# Patient Record
Sex: FEMALE | Race: WHITE | NOT HISPANIC OR LATINO | URBAN - METROPOLITAN AREA
[De-identification: names, ages, dates, MRNs, and addresses within clinical notes are randomized per-mention and may not be internally consistent; named-entity substitution may affect disease eponyms.]

---

## 2017-04-27 ENCOUNTER — TELEPHONE (OUTPATIENT)
Dept: MEDICAL GROUP | Age: 57
End: 2017-04-27

## 2017-04-27 DIAGNOSIS — Z12.31 ENCOUNTER FOR SCREENING MAMMOGRAM FOR BREAST CANCER: ICD-10-CM

## 2017-04-27 DIAGNOSIS — Z11.59 NEED FOR HEPATITIS C SCREENING TEST: ICD-10-CM

## 2017-04-27 DIAGNOSIS — E78.2 MIXED HYPERLIPIDEMIA: ICD-10-CM

## 2017-04-27 NOTE — TELEPHONE ENCOUNTER
Phone Number Called: [phone]    Message: patient has upcoming appt with Dr. Olguin and requesting routine labs with a hep c order.  Please advise.    Future Appointments       Provider Department Center    5/18/2017 10:40 AM Abran Olguin M.D. [facility] [address]            Left Message for patient to call back: no

## 2017-04-27 NOTE — TELEPHONE ENCOUNTER
Phone Number Called: [phone] (home)     Message: Left VM for patient to return call    Left Message for patient to call back: yes

## 2017-04-28 NOTE — TELEPHONE ENCOUNTER
Phone Number Called: [phone] (home)       Message: Left VM for patient to return call    Left Message for patient to call back: yes

## 2017-05-01 NOTE — TELEPHONE ENCOUNTER
Phone Number Called: [phone] (home)     Message: Left 3rd VM for patient to return our call. Letter also mailed to home address with order information below.    Left Message for patient to call back: yes

## 2017-05-17 ENCOUNTER — TELEPHONE (OUTPATIENT)
Dept: MEDICAL GROUP | Age: 57
End: 2017-05-17

## 2017-05-17 NOTE — TELEPHONE ENCOUNTER
ESTABLISHED PATIENT PRE-VISIT PLANNING     Note: Patient will not be contacted if there is no indication to call.     1.  Reviewed note from last office visit with PCP and/or other med group provider: Yes    2.  If any orders were placed at last visit, do we have Results/Consult Notes?        •  Labs - Labs ordered, completed and results are in chart.       •  Imaging - Imaging was not ordered at last office visit.       •  Referrals - No referrals were ordered at last office visit.    3.  Immunizations were updated in [facility] using WebIZ?: Yes       •  Web Iz Recommendations: FLU HEPATITIS A  HEPATITIS B MMR  TDAP VARICELLA (Chicken Pox)     4.  Patient is due for the following Health Maintenance Topics:   Health Maintenance Due   Topic Date Due   • IMM DTaP/Tdap/Td Vaccine (1 - Tdap) 06/29/1981         5.  Patient was not informed to arrive 15 min prior to their scheduled appointment and bring in their medication bottles.

## 2017-05-18 ENCOUNTER — OFFICE VISIT (OUTPATIENT)
Dept: MEDICAL GROUP | Age: 57
End: 2017-05-18
Payer: COMMERCIAL

## 2017-05-18 VITALS
HEIGHT: 67 IN | TEMPERATURE: 97.9 F | DIASTOLIC BLOOD PRESSURE: 84 MMHG | HEART RATE: 46 BPM | BODY MASS INDEX: 33.59 KG/M2 | OXYGEN SATURATION: 97 % | WEIGHT: 214 LBS | SYSTOLIC BLOOD PRESSURE: 132 MMHG

## 2017-05-18 DIAGNOSIS — E78.2 MIXED HYPERLIPIDEMIA: ICD-10-CM

## 2017-05-18 DIAGNOSIS — I10 ESSENTIAL HYPERTENSION: ICD-10-CM

## 2017-05-18 DIAGNOSIS — Z12.11 SCREEN FOR COLON CANCER: ICD-10-CM

## 2017-05-18 DIAGNOSIS — M17.0 PRIMARY OSTEOARTHRITIS OF BOTH KNEES: ICD-10-CM

## 2017-05-18 DIAGNOSIS — E66.9 OBESITY (BMI 30.0-34.9): ICD-10-CM

## 2017-05-18 DIAGNOSIS — E87.6 POTASSIUM (K) DEFICIENCY: ICD-10-CM

## 2017-05-18 PROCEDURE — 99214 OFFICE O/P EST MOD 30 MIN: CPT | Performed by: INTERNAL MEDICINE

## 2017-05-18 ASSESSMENT — ENCOUNTER SYMPTOMS
MUSCULOSKELETAL NEGATIVE: 1
RESPIRATORY NEGATIVE: 1
CONSTITUTIONAL NEGATIVE: 1
PSYCHIATRIC NEGATIVE: 1
CARDIOVASCULAR NEGATIVE: 1
NEUROLOGICAL NEGATIVE: 1
EYES NEGATIVE: 1
GASTROINTESTINAL NEGATIVE: 1

## 2017-05-18 ASSESSMENT — PATIENT HEALTH QUESTIONNAIRE - PHQ9: CLINICAL INTERPRETATION OF PHQ2 SCORE: 0

## 2017-05-18 NOTE — MR AVS SNAPSHOT
"        Desirae Paris   2017 10:40 AM   Office Visit   MRN: [id number]    Department:  [address] [place] [address]   Dept Phone:  [phone]    Description:  Female : 1960   Provider:  Abran Olguin M.D.           Reason for Visit     Hyperlipidemia lab review and 6 mo follow up      Allergies as of 2017     No Known Allergies      You were diagnosed with     Obesity (BMI 30.0-34.9)   [067262]       Essential hypertension   [7435749]       Mixed hyperlipidemia   [272.2.ICD-9-CM]       Primary osteoarthritis of both knees   [843742]       Screen for colon cancer   [279232]         Vital Signs     Blood Pressure Pulse Temperature Height Weight Body Mass Index    132/84 mmHg 46 36.6 °C (97.9 °F) 1.708 m (5' 7.24\") 97.07 kg (214 lb) 33.27 kg/m2    Oxygen Saturation Smoking Status                97% Never Smoker           Basic Information     Date Of Birth Sex Race Ethnicity Preferred Language    1960 Female White Non- English      Your appointments     2017  4:00 PM   MA [id number] with S ODALIS MG 1   [employer] [facility] MAMMOGRAP[facility] ([place])    [address]  [place] [address] [phone]           No deodorant, powder, perfume or lotion under the arm or breast area.            May 18, 2018 10:00 AM   ANNUAL EXAM PREVENTATIVE with Abran Olguin M.D.   [address]  [place] [id number] [phone]              Problem List              ICD-10-CM Priority Class Noted - Resolved    Essential hypertension I10   2012 - Present    Mixed hyperlipidemia E78.2   2012 - Present    DJD (degenerative joint disease) of knee M17.10   2012 - Present    Colonic polyp-  colonoscopy- stevie 2017 K63.5   2015 - Present    Potassium (K) deficiency E87.6   2016 - Present    Obesity (BMI 30.0-34.9) E66.9   2017 - Present      Health Maintenance        Date Due Completion Dates   " IMM DTaP/Tdap/Td Vaccine (1 - Tdap) 1/8/2004 1/7/2004, 6/5/1967, 9/27/1963, 1960, 1960, 1960    MAMMOGRAM 8/14/2017 8/14/2015, 5/31/2013, 9/30/2009, 9/30/2009, 5/22/2008, 5/22/2008, 2/27/2007, 2/27/2007    COLONOSCOPY 7/3/2022 7/3/2012            Current Immunizations     DTP 6/5/1967, 9/27/1963, 1960, 1960, 1960    Influenza TIV (IM) 9/6/2012, 9/30/2011, 10/6/2010    Influenza Vac Subunit Quad Inj (Pf) 10/24/2015    Influenza Vaccine Quad Inj (Pf) 10/24/2015    Influenza Vaccine Quad Inj (Preserved) 10/28/2013    MMR Vaccine 1/7/2004    Measles Vaccine-Historical Data 5/3/1963    OPV - Historical Data 6/26/1963, 3/10/1963, 1962, 1962, 1962, 1962    TD Vaccine 1/7/2004      Below and/or attached are the medications your provider expects you to take. Review all of your home medications and newly ordered medications with your provider and/or pharmacist. Follow medication instructions as directed by your provider and/or pharmacist. Please keep your medication list with you and share with your provider. Update the information when medications are discontinued, doses are changed, or new medications (including over-the-counter products) are added; and carry medication information at all times in the event of emergency situations     Allergies:  No Known Allergies          Medications  Valid as of: May 18, 2017 - 11:21 AM    Generic Name Brand Name Tablet Size Instructions for use    Cholecalciferol (Tab) cholecalciferol 1000 UNIT Take 2 Tabs by mouth every day.        Fenofibrate (Tab) TRIGLIDE 160 MG Take 1 Tab by mouth every day. TAKE 1 TAB BY MOUTH EVERY DAY.        Glucosamine Sulfate (Cap) glucosamine Sulfate 500 MG Take 500 mg by mouth 3 times a day, with meals.        HydroCHLOROthiazide (Tab) HYDRODIURIL 25 MG Take 1 Tab by mouth every day.        Lisinopril (Tab) PRINIVIL 10 MG Take 1 Tab by mouth every day. TAKE 1 TAB BY MOUTH EVERY DAY.        Metoprolol  Tartrate (Tab) LOPRESSOR 50 MG Take 1 Tab by mouth 2 times a day.        Omega-3 Fatty Acids (Cap) fish oil 1000 MG Take 1,000 mg by mouth 3 times a day, with meals.        Potassium Chloride (Cap CR) MICRO-K 10 MEQ Take 1 Cap by mouth 2 times a day.        .                 Medicines prescribed today were sent to:     [id number] - [place] - [address] [place] [address]    Phone: [phone] Fax: [phone]    Open 24 Hours?: No      Medication refill instructions:       If your prescription bottle indicates you have medication refills left, it is not necessary to call your provider’s office. Please contact your pharmacy and they will refill your medication.    If your prescription bottle indicates you do not have any refills left, you may request refills at any time through one of the following ways: The online [employer] system (except Urgent Care), by calling your provider’s office, or by asking your pharmacy to contact your provider’s office with a refill request. Medication refills are processed only during regular business hours and may not be available until the next business day. Your provider may request additional information or to have a follow-up visit with you prior to refilling your medication.   *Please Note: Medication refills are assigned a new Rx number when refilled electronically. Your pharmacy may indicate that no refills were authorized even though a new prescription for the same medication is available at the pharmacy. Please request the medicine by name with the pharmacy before contacting your provider for a refill.        Your To Do List     Future Labs/Procedures Complete By Expires    CBC WITH DIFFERENTIAL  As directed 5/18/2018    COMP METABOLIC PANEL  As directed 5/18/2018    LIPID PROFILE  As directed 5/18/2018      Referral     A referral request has been sent to our patient care coordination department. Please allow 3-5 business days for us to process this  request and contact you either by phone or mail. If you do not hear from us by the 5th business day, please call us at [phone].           [employer] Access Code: [email or web address]  Expires: 6/17/2017 10:23 AM    [employer]  A secure, online tool to manage your health information     [employer]’s [employer]® is a secure, online tool that connects you to your personalized health information from the privacy of your home -- day or night - making it very easy for you to manage your healthcare. Once the activation process is completed, you can even access your medical information using the [employer] hany, which is available for free in the Apple Hany store or Google Play store.     [employer] provides the following levels of access (as shown below):   My Chart Features   [facility] Primary [facility] Doctor [facility]  Specialists [facility]  Urgent  Care Non-[facility]  Primary Care  Doctor   Email your healthcare team securely and privately 24/7 X X X    Manage appointments: schedule your next appointment; view details of past/upcoming appointments X      Request prescription refills. X      View recent personal medical records, including lab and immunizations X X X X   View health record, including health history, allergies, medications X X X X   Read reports about your outpatient visits, procedures, consult and ER notes X X X X   See your discharge summary, which is a recap of your hospital and/or ER visit that includes your diagnosis, lab results, and care plan. X X       How to register for [employer]:  1. Go to  [email or web address].  2. Click on the Sign Up Now box, which takes you to the New Member Sign Up page. You will need to provide the following information:  a. Enter your [employer] Access Code exactly as it appears at the top of this page. (You will not need to use this code after you’ve completed the sign-up process. If you do not sign up before the expiration date, you must request a new code.)   b. Enter your date of birth.    c. Enter your home email address.   d. Click Submit, and follow the next screen’s instructions.  3. Create a [employer] ID. This will be your [employer] login ID and cannot be changed, so think of one that is secure and easy to remember.  4. Create a [employer] password. You can change your password at any time.  5. Enter your Password Reset Question and Answer. This can be used at a later time if you forget your password.   6. Enter your e-mail address. This allows you to receive e-mail notifications when new information is available in [employer].  7. Click Sign Up. You can now view your health information.    For assistance activating your [employer] account, call [phone]

## 2017-05-19 NOTE — PROGRESS NOTES
Subjective:      Desirae Paris is a 56 y.o. female who presents with Hyperlipidemia  and   The patient is here for followup of chronic medical problems listed below. The patient is compliant with medications and having no side effects from them. Denies chest pain, abdominal pain, dyspnea, myalgias, or cough.   Patient Active Problem List    Diagnosis Date Noted   • Obesity (BMI 30.0-34.9) 05/18/2017   • Potassium (K) deficiency 07/06/2016   • Essential hypertension 01/18/2012   • Mixed hyperlipidemia 01/18/2012   • DJD (degenerative joint disease) of knee 01/18/2012     No Known Allergies      Outpatient Prescriptions Prior to Visit   Medication Sig Dispense Refill   • lisinopril (PRINIVIL) 10 MG Tab Take 1 Tab by mouth every day. TAKE 1 TAB BY MOUTH EVERY DAY. 90 Tab 4   • hydrochlorothiazide (HYDRODIURIL) 25 MG Tab Take 1 Tab by mouth every day. 90 Tab 4   • fenofibrate (TRIGLIDE) 160 MG tablet Take 1 Tab by mouth every day. TAKE 1 TAB BY MOUTH EVERY DAY. 90 Tab 4   • metoprolol (LOPRESSOR) 50 MG Tab Take 1 Tab by mouth 2 times a day. 180 Tab 4   • potassium chloride (MICRO-K) 10 MEQ capsule Take 1 Cap by mouth 2 times a day. 180 Cap 4   • glucosamine Sulfate 500 MG Cap Take 500 mg by mouth 3 times a day, with meals.     • Omega-3 Fatty Acids (FISH OIL) 1000 MG CAPS capsule Take 1,000 mg by mouth 3 times a day, with meals.     • vitamin D (CHOLECALCIFEROL) 1000 UNIT TABS Take 2 Tabs by mouth every day. 30 Tab 11     No facility-administered medications prior to visit.               HPI    Review of Systems   Constitutional: Negative.    HENT: Negative.    Eyes: Negative.    Respiratory: Negative.    Cardiovascular: Negative.    Gastrointestinal: Negative.    Genitourinary: Negative.    Musculoskeletal: Negative.    Skin: Negative.    Neurological: Negative.    Endo/Heme/Allergies: Negative.    Psychiatric/Behavioral: Negative.           Objective:     /84 mmHg  Pulse 46  Temp(Src) 36.6 °C (97.9 °F)  Ht  "1.708 m (5' 7.24\")  Wt 97.07 kg (214 lb)  BMI 33.27 kg/m2  SpO2 97%     Physical Exam   Constitutional: She is oriented to person, place, and time. She appears well-developed and well-nourished.   HENT:   Head: Normocephalic and atraumatic.   Right Ear: External ear normal.   Left Ear: External ear normal.   Nose: Nose normal.   Mouth/Throat: Oropharynx is clear and moist.   Eyes: Conjunctivae and EOM are normal. Pupils are equal, round, and reactive to light. Right eye exhibits no discharge. Left eye exhibits no discharge. No scleral icterus.   Neck: Normal range of motion. Neck supple. No JVD present. No tracheal deviation present. No thyromegaly present.   Cardiovascular: Normal rate, regular rhythm, normal heart sounds and intact distal pulses.  Exam reveals no gallop and no friction rub.    Pulmonary/Chest: Effort normal and breath sounds normal. No stridor. No respiratory distress. She has no wheezes. She has no rales. She exhibits no tenderness.   Abdominal: Soft. Bowel sounds are normal. She exhibits no distension and no mass. There is no tenderness. There is no rebound and no guarding. No hernia.   Musculoskeletal: Normal range of motion. She exhibits no edema or tenderness.   Lymphadenopathy:     She has no cervical adenopathy.   Neurological: She is alert and oriented to person, place, and time. She has normal reflexes. She displays normal reflexes. No cranial nerve deficit. Coordination normal.   Skin: Skin is warm and dry. No rash noted. No erythema. No pallor.   Psychiatric: She has a normal mood and affect. Her behavior is normal. Judgment and thought content normal.   Nursing note and vitals reviewed.  No visits with results within 1 Month(s) from this visit.  Latest known visit with results is:    Orders Only on 06/28/2016   Component Date Value   • WBC 06/28/2016 5.1    • RBC 06/28/2016 4.98    • Hemoglobin 06/28/2016 15.0    • Hematocrit 06/28/2016 45.8    • MCV 06/28/2016 92    • MCH " 06/28/2016 30.1    • MCHC 06/28/2016 32.8    • RDW 06/28/2016 13.5    • Platelet Count 06/28/2016 313    • Neutrophils-Polys 06/28/2016 50    • Lymphocytes 06/28/2016 37    • Monocytes 06/28/2016 9    • Eosinophils 06/28/2016 3    • Basophils 06/28/2016 1    • Immature Cells 06/28/2016 CANCELED    • Neutrophils (Absolute) 06/28/2016 2.6    • Lymphs (Absolute) 06/28/2016 1.9    • Monos (Absolute) 06/28/2016 0.5    • Eos (Absolute) 06/28/2016 0.1    • Baso (Absolute) 06/28/2016 0.0    • Immature Granulocytes 06/28/2016 0    • Immature Granulocytes (a* 06/28/2016 0.0    • Nucleated RBC 06/28/2016 CANCELED    • Comments-Diff 06/28/2016 CANCELED    • Glucose 06/28/2016 96    • Bun 06/28/2016 15    • Creatinine 06/28/2016 0.87    • GFR If Non  Ameri* 06/28/2016 76    • GFR If  06/28/2016 88    • Bun-Creatinine Ratio 06/28/2016 17    • Sodium 06/28/2016 140    • Potassium 06/28/2016 4.3    • Chloride 06/28/2016 98    • Co2 06/28/2016 25    • Calcium 06/28/2016 9.9    • Total Protein 06/28/2016 7.2    • Albumin 06/28/2016 4.5    • Globulin 06/28/2016 2.7    • A-G Ratio 06/28/2016 1.7    • Total Bilirubin 06/28/2016 0.4    • Alkaline Phosphatase 06/28/2016 62    • AST(SGOT) 06/28/2016 20    • ALT(SGPT) 06/28/2016 25    • Cholesterol,Tot 06/28/2016 193    • Triglycerides 06/28/2016 202*   • HDL 06/28/2016 57    • VLDL Cholesterol Calc 06/28/2016 40    • LDL 06/28/2016 96    • Comment: 06/28/2016 CANCELED    • WBC 05/08/2017 4.6    • RBC 05/08/2017 4.91    • Hemoglobin 05/08/2017 14.7    • Hematocrit 05/08/2017 44.2    • MCV 05/08/2017 90    • MCH 05/08/2017 29.9    • MCHC 05/08/2017 33.3    • RDW 05/08/2017 13.4    • Platelet Count 05/08/2017 302    • Neutrophils-Polys 05/08/2017 51    • Lymphocytes 05/08/2017 38    • Monocytes 05/08/2017 8    • Eosinophils 05/08/2017 2    • Basophils 05/08/2017 1    • Immature Cells 05/08/2017 CANCELED    • Neutrophils (Absolute) 05/08/2017 2.3    • Lymphs (Absolute)  05/08/2017 1.7    • Monos (Absolute) 05/08/2017 0.4    • Eos (Absolute) 05/08/2017 0.1    • Baso (Absolute) 05/08/2017 0.0    • Immature Granulocytes 05/08/2017 0    • Immature Granulocytes (a* 05/08/2017 0.0    • Nucleated RBC 05/08/2017 CANCELED    • Comments-Diff 05/08/2017 CANCELED    • Glucose 05/08/2017 107*   • Bun 05/08/2017 14    • Creatinine 05/08/2017 0.87    • GFR If Non  Ameri* 05/08/2017 76    • GFR If  05/08/2017 87    • Bun-Creatinine Ratio 05/08/2017 16    • Sodium 05/08/2017 141    • Potassium 05/08/2017 4.1    • Chloride 05/08/2017 99    • Co2 05/08/2017 24    • Calcium 05/08/2017 10.4*   • Total Protein 05/08/2017 6.9    • Albumin 05/08/2017 4.8    • Globulin 05/08/2017 2.1    • A-G Ratio 05/08/2017 2.3*   • Total Bilirubin 05/08/2017 0.4    • Alkaline Phosphatase 05/08/2017 58    • AST(SGOT) 05/08/2017 20    • ALT(SGPT) 05/08/2017 22    • Cholesterol,Tot 05/08/2017 164    • Triglycerides 05/08/2017 153*   • HDL 05/08/2017 58    • VLDL Cholesterol Calc 05/08/2017 31    • LDL 05/08/2017 75    • Comment: 05/08/2017 CANCELED    • TSH 05/08/2017 2.820    • Hepatitis C Antibody 05/08/2017 <0.1       No results found for: HBA1C  Lab Results   Component Value Date/Time    SODIUM 141 05/08/2017 08:29 AM    POTASSIUM 4.1 05/08/2017 08:29 AM    CHLORIDE 99 05/08/2017 08:29 AM    CO2 24 05/08/2017 08:29 AM    GLUCOSE 107* 05/08/2017 08:29 AM    BUN 14 05/08/2017 08:29 AM    CREATININE 0.87 05/08/2017 08:29 AM    BUN-CREATININE RATIO 16 05/08/2017 08:29 AM    ALKALINE PHOSPHATASE 58 05/08/2017 08:29 AM    AST(SGOT) 20 05/08/2017 08:29 AM    ALT(SGPT) 22 05/08/2017 08:29 AM    TOTAL BILIRUBIN 0.4 05/08/2017 08:29 AM     No results found for: INR  Lab Results   Component Value Date/Time    CHOLESTEROL, 05/08/2017 08:29 AM    LDL 75 05/08/2017 08:29 AM    HDL 58 05/08/2017 08:29 AM    TRIGLYCERIDES 153* 05/08/2017 08:29 AM       No results found for: TESTOSTERONE  Lab Results    Component Value Date/Time    TSH 2.820 05/08/2017 08:29 AM    TSH 2.550 04/18/2014 08:39 AM     No results found for: FREET4  No results found for: URICACID  No components found for: VITB12  Lab Results   Component Value Date/Time    25-HYDROXY   VITAMIN D 25 42.4 04/18/2014 08:39 AM    25-HYDROXY   VITAMIN D 25 38.7 02/24/2012 10:19 AM                   Assessment/Plan:     1. Obesity (BMI 30.0-34.9)   . diet/exercise/lose 15 lbs.; patient counseled    - Patient identified as having weight management issue.  Appropriate orders and counseling given.    2. Essential hypertension     Under good control. Continue same regimen.    3. Mixed hyperlipidemia    Under good control. Continue same regimen.  - COMP METABOLIC PANEL; Future  - LIPID PROFILE; Future  - CBC WITH DIFFERENTIAL; Future    4. Primary osteoarthritis of both knees Under good control. Continue same regimen.     5. Screen for colon cancer Under good control. Continue same regimen. June   - REFERRAL TO GASTROENTEROLOGY     6. Potassium (K) deficiency Under good control. Continue same regimen.        30 minute face-to-face encounter took place today.  More than half of this time was spent in the coordination of care of the above problems, as well as counseling.

## 2017-06-22 ENCOUNTER — HOSPITAL ENCOUNTER (OUTPATIENT)
Dept: RADIOLOGY | Facility: MEDICAL CENTER | Age: 57
End: 2017-06-22
Attending: INTERNAL MEDICINE
Payer: COMMERCIAL

## 2017-06-22 DIAGNOSIS — Z12.31 ENCOUNTER FOR SCREENING MAMMOGRAM FOR BREAST CANCER: ICD-10-CM

## 2017-06-22 PROCEDURE — G0202 SCR MAMMO BI INCL CAD: HCPCS

## 2017-09-13 DIAGNOSIS — I10 ESSENTIAL HYPERTENSION: ICD-10-CM

## 2017-09-14 RX ORDER — LISINOPRIL 10 MG/1
TABLET ORAL
Qty: 90 TAB | Refills: 4 | Status: SHIPPED | OUTPATIENT
Start: 2017-09-14 | End: 2018-09-30 | Stop reason: SDUPTHER

## 2018-05-15 LAB
ALBUMIN SERPL-MCNC: 4.3 G/DL (ref 3.5–5.5)
ALBUMIN/GLOB SERPL: 1.7 {RATIO} (ref 1.2–2.2)
ALP SERPL-CCNC: 58 IU/L (ref 39–117)
ALT SERPL-CCNC: 23 IU/L (ref 0–32)
AST SERPL-CCNC: 22 IU/L (ref 0–40)
BASOPHILS # BLD AUTO: 0 X10E3/UL (ref 0–0.2)
BASOPHILS NFR BLD AUTO: 0 %
BILIRUB SERPL-MCNC: 0.4 MG/DL (ref 0–1.2)
BUN SERPL-MCNC: 19 MG/DL (ref 6–24)
BUN/CREAT SERPL: 22 (ref 9–23)
CALCIUM SERPL-MCNC: 9.8 MG/DL (ref 8.7–10.2)
CHLORIDE SERPL-SCNC: 100 MMOL/L (ref 96–106)
CHOLEST SERPL-MCNC: 155 MG/DL (ref 100–199)
CO2 SERPL-SCNC: 25 MMOL/L (ref 18–29)
CREAT SERPL-MCNC: 0.88 MG/DL (ref 0.57–1)
EOSINOPHIL # BLD AUTO: 0.1 X10E3/UL (ref 0–0.4)
EOSINOPHIL NFR BLD AUTO: 2 %
ERYTHROCYTE [DISTWIDTH] IN BLOOD BY AUTOMATED COUNT: 13.2 % (ref 12.3–15.4)
GFR SERPLBLD CREATININE-BSD FMLA CKD-EPI: 74 ML/MIN/1.73
GFR SERPLBLD CREATININE-BSD FMLA CKD-EPI: 86 ML/MIN/1.73
GLOBULIN SER CALC-MCNC: 2.5 G/DL (ref 1.5–4.5)
GLUCOSE SERPL-MCNC: 103 MG/DL (ref 65–99)
HCT VFR BLD AUTO: 43.5 % (ref 34–46.6)
HDLC SERPL-MCNC: 52 MG/DL
HGB BLD-MCNC: 14.3 G/DL (ref 11.1–15.9)
IMM GRANULOCYTES # BLD: 0 X10E3/UL (ref 0–0.1)
IMM GRANULOCYTES NFR BLD: 0 %
IMMATURE CELLS  115398: NORMAL
LABORATORY COMMENT REPORT: NORMAL
LDLC SERPL CALC-MCNC: 74 MG/DL (ref 0–99)
LYMPHOCYTES # BLD AUTO: 1.9 X10E3/UL (ref 0.7–3.1)
LYMPHOCYTES NFR BLD AUTO: 36 %
MCH RBC QN AUTO: 29.2 PG (ref 26.6–33)
MCHC RBC AUTO-ENTMCNC: 32.9 G/DL (ref 31.5–35.7)
MCV RBC AUTO: 89 FL (ref 79–97)
MONOCYTES # BLD AUTO: 0.5 X10E3/UL (ref 0.1–0.9)
MONOCYTES NFR BLD AUTO: 10 %
MORPHOLOGY BLD-IMP: NORMAL
NEUTROPHILS # BLD AUTO: 2.7 X10E3/UL (ref 1.4–7)
NEUTROPHILS NFR BLD AUTO: 52 %
NRBC BLD AUTO-RTO: NORMAL %
PLATELET # BLD AUTO: 332 X10E3/UL (ref 150–379)
POTASSIUM SERPL-SCNC: 4.2 MMOL/L (ref 3.5–5.2)
PROT SERPL-MCNC: 6.8 G/DL (ref 6–8.5)
RBC # BLD AUTO: 4.89 X10E6/UL (ref 3.77–5.28)
SODIUM SERPL-SCNC: 141 MMOL/L (ref 134–144)
TRIGL SERPL-MCNC: 147 MG/DL (ref 0–149)
VLDLC SERPL CALC-MCNC: 29 MG/DL (ref 5–40)
WBC # BLD AUTO: 5.2 X10E3/UL (ref 3.4–10.8)

## 2018-05-18 ENCOUNTER — OFFICE VISIT (OUTPATIENT)
Dept: MEDICAL GROUP | Age: 58
End: 2018-05-18
Payer: COMMERCIAL

## 2018-05-18 VITALS
BODY MASS INDEX: 33.9 KG/M2 | TEMPERATURE: 97.5 F | DIASTOLIC BLOOD PRESSURE: 78 MMHG | SYSTOLIC BLOOD PRESSURE: 115 MMHG | HEART RATE: 54 BPM | OXYGEN SATURATION: 91 % | HEIGHT: 67 IN | WEIGHT: 216 LBS

## 2018-05-18 DIAGNOSIS — Z00.00 ROUTINE GENERAL MEDICAL EXAMINATION AT A HEALTH CARE FACILITY: ICD-10-CM

## 2018-05-18 DIAGNOSIS — M17.0 PRIMARY OSTEOARTHRITIS OF BOTH KNEES: ICD-10-CM

## 2018-05-18 DIAGNOSIS — E66.9 OBESITY (BMI 30.0-34.9): ICD-10-CM

## 2018-05-18 DIAGNOSIS — I10 ESSENTIAL HYPERTENSION: ICD-10-CM

## 2018-05-18 DIAGNOSIS — E87.6 POTASSIUM (K) DEFICIENCY: ICD-10-CM

## 2018-05-18 DIAGNOSIS — E78.2 MIXED HYPERLIPIDEMIA: ICD-10-CM

## 2018-05-18 PROCEDURE — 99396 PREV VISIT EST AGE 40-64: CPT | Performed by: INTERNAL MEDICINE

## 2018-05-18 ASSESSMENT — ENCOUNTER SYMPTOMS
CONSTITUTIONAL NEGATIVE: 1
PSYCHIATRIC NEGATIVE: 1
MUSCULOSKELETAL NEGATIVE: 1
GASTROINTESTINAL NEGATIVE: 1
NEUROLOGICAL NEGATIVE: 1
CARDIOVASCULAR NEGATIVE: 1
RESPIRATORY NEGATIVE: 1
EYES NEGATIVE: 1

## 2018-05-18 ASSESSMENT — PATIENT HEALTH QUESTIONNAIRE - PHQ9: CLINICAL INTERPRETATION OF PHQ2 SCORE: 0

## 2018-05-18 NOTE — PROGRESS NOTES
Subjective:      Desirae Paris is a 57 y.o. female who presents with Annual Exam (patient doing well)  and  The patient is here for followup of chronic medical problems listed below. The patient is compliant with medications and having no side effects from them. Denies chest pain, abdominal pain, dyspnea, myalgias, or cough.   Patient Active Problem List    Diagnosis Date Noted   • Primary osteoarthritis of both knees 05/18/2018   • Obesity (BMI 30.0-34.9) 05/18/2017   • Potassium (K) deficiency 07/06/2016   • Essential hypertension 01/18/2012   • Mixed hyperlipidemia 01/18/2012     No Known Allergies  Outpatient Medications Prior to Visit   Medication Sig Dispense Refill   • lisinopril (PRINIVIL) 10 MG Tab TAKE 1 TABLET BY MOUTH EVERY DAY 90 Tab 4   • hydrochlorothiazide (HYDRODIURIL) 25 MG Tab Take 1 Tab by mouth every day. 90 Tab 4   • potassium chloride (MICRO-K) 10 MEQ capsule Take 1 Cap by mouth 2 times a day. 180 Cap 4   • metoprolol (LOPRESSOR) 50 MG Tab Take 1 Tab by mouth 2 times a day. 180 Tab 4   • fenofibrate (TRIGLIDE) 160 MG tablet Take 1 Tab by mouth every day. TAKE 1 TAB BY MOUTH EVERY DAY. 90 Tab 4   • glucosamine Sulfate 500 MG Cap Take 500 mg by mouth 3 times a day, with meals.     • Omega-3 Fatty Acids (FISH OIL) 1000 MG CAPS capsule Take 1,000 mg by mouth 3 times a day, with meals.     • vitamin D (CHOLECALCIFEROL) 1000 UNIT TABS Take 2 Tabs by mouth every day. 30 Tab 11     No facility-administered medications prior to visit.                Annual Exam         Review of Systems   Constitutional: Negative.    HENT: Negative.    Eyes: Negative.    Respiratory: Negative.    Cardiovascular: Negative.    Gastrointestinal: Negative.    Genitourinary: Negative.    Musculoskeletal: Negative.    Skin: Negative.    Neurological: Negative.    Endo/Heme/Allergies: Negative.    Psychiatric/Behavioral: Negative.           Objective:     /78   Pulse (!) 54   Temp 36.4 °C (97.5 °F)   Ht 1.708 m (5'  "7.24\")   Wt 98 kg (216 lb)   SpO2 91%   BMI 33.59 kg/m²      Physical Exam   Constitutional: She is oriented to person, place, and time. She appears well-developed and well-nourished. No distress.   HENT:   Head: Normocephalic and atraumatic.   Right Ear: External ear normal.   Left Ear: External ear normal.   Nose: Nose normal.   Mouth/Throat: Oropharynx is clear and moist. No oropharyngeal exudate.   Eyes: Conjunctivae and EOM are normal. Pupils are equal, round, and reactive to light. Right eye exhibits no discharge. Left eye exhibits no discharge. No scleral icterus.   Neck: Normal range of motion. Neck supple. No JVD present. No tracheal deviation present. No thyromegaly present.   Cardiovascular: Normal rate, regular rhythm, normal heart sounds and intact distal pulses.  Exam reveals no gallop and no friction rub.    No murmur heard.  Pulmonary/Chest: Effort normal and breath sounds normal. No stridor. No respiratory distress. She has no wheezes. She has no rales. She exhibits no tenderness.   Abdominal: Soft. Bowel sounds are normal. She exhibits no distension and no mass. There is no tenderness. There is no rebound and no guarding.   Musculoskeletal: Normal range of motion. She exhibits no edema or tenderness.   Lymphadenopathy:     She has no cervical adenopathy.   Neurological: She is alert and oriented to person, place, and time. She has normal reflexes. She displays normal reflexes. No cranial nerve deficit. She exhibits normal muscle tone. Coordination normal.   Skin: Skin is warm and dry. No rash noted. She is not diaphoretic. No erythema. No pallor.   Psychiatric: She has a normal mood and affect. Her behavior is normal. Judgment and thought content normal.   Vitals reviewed.    Orders Only on 05/14/2018   Component Date Value   • WBC 05/14/2018 5.2    • RBC 05/14/2018 4.89    • Hemoglobin 05/14/2018 14.3    • Hematocrit 05/14/2018 43.5    • MCV 05/14/2018 89    • MCH 05/14/2018 29.2    • MCHC " 05/14/2018 32.9    • RDW 05/14/2018 13.2    • Platelet Count 05/14/2018 332    • Neutrophils-Polys 05/14/2018 52    • Lymphocytes 05/14/2018 36    • Monocytes 05/14/2018 10    • Eosinophils 05/14/2018 2    • Basophils 05/14/2018 0    • Immature Cells 05/14/2018 CANCELED    • Neutrophils (Absolute) 05/14/2018 2.7    • Lymphs (Absolute) 05/14/2018 1.9    • Monos (Absolute) 05/14/2018 0.5    • Eos (Absolute) 05/14/2018 0.1    • Baso (Absolute) 05/14/2018 0.0    • Immature Granulocytes 05/14/2018 0    • Immature Granulocytes (a* 05/14/2018 0.0    • Nucleated RBC 05/14/2018 CANCELED    • Comments-Diff 05/14/2018 CANCELED    • Glucose 05/14/2018 103*   • Bun 05/14/2018 19    • Creatinine 05/14/2018 0.88    • GFR If Non  Ameri* 05/14/2018 74    • GFR If  05/14/2018 86    • Bun-Creatinine Ratio 05/14/2018 22    • Sodium 05/14/2018 141    • Potassium 05/14/2018 4.2    • Chloride 05/14/2018 100    • Co2 05/14/2018 25    • Calcium 05/14/2018 9.8    • Total Protein 05/14/2018 6.8    • Albumin 05/14/2018 4.3    • Globulin 05/14/2018 2.5    • A-G Ratio 05/14/2018 1.7    • Total Bilirubin 05/14/2018 0.4    • Alkaline Phosphatase 05/14/2018 58    • AST(SGOT) 05/14/2018 22    • ALT(SGPT) 05/14/2018 23    • Cholesterol,Tot 05/14/2018 155    • Triglycerides 05/14/2018 147    • HDL 05/14/2018 52    • VLDL Cholesterol Calc 05/14/2018 29    • LDL 05/14/2018 74    • Comment: 05/14/2018 CANCELED       No results found for: HBA1C  Lab Results   Component Value Date/Time    SODIUM 141 05/14/2018 09:07 AM    SODIUM 136 02/01/2013 09:56 AM    POTASSIUM 4.2 05/14/2018 09:07 AM    POTASSIUM 3.9 02/01/2013 09:56 AM    CHLORIDE 100 05/14/2018 09:07 AM    CHLORIDE 99 02/01/2013 09:56 AM    CO2 25 05/14/2018 09:07 AM    CO2 23 02/01/2013 09:56 AM    GLUCOSE 103 (H) 05/14/2018 09:07 AM    GLUCOSE 91 02/01/2013 09:56 AM    BUN 19 05/14/2018 09:07 AM    BUN 14 02/01/2013 09:56 AM    CREATININE 0.88 05/14/2018 09:07 AM     CREATININE 1.04 (H) 02/01/2013 09:56 AM    BUNCREATRAT 22 05/14/2018 09:07 AM    BUNCREATRAT 13 02/01/2013 09:56 AM    ALKPHOSPHAT 58 05/14/2018 09:07 AM    ALKPHOSPHAT 51 02/01/2013 09:56 AM    ASTSGOT 22 05/14/2018 09:07 AM    ASTSGOT 17 02/01/2013 09:56 AM    ALTSGPT 23 05/14/2018 09:07 AM    ALTSGPT 14 02/01/2013 09:56 AM    TBILIRUBIN 0.4 05/14/2018 09:07 AM    TBILIRUBIN 0.9 02/01/2013 09:56 AM     No results found for: INR  Lab Results   Component Value Date/Time    CHOLSTRLTOT 155 05/14/2018 09:07 AM    CHOLSTRLTOT 155 02/01/2013 09:56 AM    LDL 74 05/14/2018 09:07 AM    LDL 77 02/01/2013 09:56 AM    HDL 52 05/14/2018 09:07 AM    HDL 51 02/01/2013 09:56 AM    TRIGLYCERIDE 147 05/14/2018 09:07 AM    TRIGLYCERIDE 136 02/01/2013 09:56 AM       No results found for: TESTOSTERONE  Lab Results   Component Value Date/Time    TSH 2.820 05/08/2017 08:29 AM    TSH 2.550 04/18/2014 08:39 AM     No results found for: FREET4  No results found for: URICACID  No components found for: VITB12  Lab Results   Component Value Date/Time    25HYDROXY 42.4 04/18/2014 08:39 AM    25HYDROXY 38.7 02/24/2012 10:19 AM     Orders Only on 05/14/2018   Component Date Value   • WBC 05/14/2018 5.2    • RBC 05/14/2018 4.89    • Hemoglobin 05/14/2018 14.3    • Hematocrit 05/14/2018 43.5    • MCV 05/14/2018 89    • MCH 05/14/2018 29.2    • MCHC 05/14/2018 32.9    • RDW 05/14/2018 13.2    • Platelet Count 05/14/2018 332    • Neutrophils-Polys 05/14/2018 52    • Lymphocytes 05/14/2018 36    • Monocytes 05/14/2018 10    • Eosinophils 05/14/2018 2    • Basophils 05/14/2018 0    • Immature Cells 05/14/2018 CANCELED    • Neutrophils (Absolute) 05/14/2018 2.7    • Lymphs (Absolute) 05/14/2018 1.9    • Monos (Absolute) 05/14/2018 0.5    • Eos (Absolute) 05/14/2018 0.1    • Baso (Absolute) 05/14/2018 0.0    • Immature Granulocytes 05/14/2018 0    • Immature Granulocytes (a* 05/14/2018 0.0    • Nucleated RBC 05/14/2018 CANCELED    • Comments-Diff  05/14/2018 CANCELED    • Glucose 05/14/2018 103*   • Bun 05/14/2018 19    • Creatinine 05/14/2018 0.88    • GFR If Non  Ameri* 05/14/2018 74    • GFR If  05/14/2018 86    • Bun-Creatinine Ratio 05/14/2018 22    • Sodium 05/14/2018 141    • Potassium 05/14/2018 4.2    • Chloride 05/14/2018 100    • Co2 05/14/2018 25    • Calcium 05/14/2018 9.8    • Total Protein 05/14/2018 6.8    • Albumin 05/14/2018 4.3    • Globulin 05/14/2018 2.5    • A-G Ratio 05/14/2018 1.7    • Total Bilirubin 05/14/2018 0.4    • Alkaline Phosphatase 05/14/2018 58    • AST(SGOT) 05/14/2018 22    • ALT(SGPT) 05/14/2018 23    • Cholesterol,Tot 05/14/2018 155    • Triglycerides 05/14/2018 147    • HDL 05/14/2018 52    • VLDL Cholesterol Calc 05/14/2018 29    • LDL 05/14/2018 74    • Comment: 05/14/2018 CANCELED                  Assessment/Plan:     1. Routine general medical examination at a health care facility   normal exam wo pap/pelvic or breast exam    2. Obesity (BMI 30.0-34.9)    diet/exercise/lose 15 lbs.; patient counseled  - Patient identified as having weight management issue.  Appropriate orders and counseling given.    3. Potassium (K) deficiency   Under good control. Continue same regimen.      4. Essential hypertension   Under good control. Continue same regimen.      5. Mixed hyperlipidemia  Under good control. Continue same regimen.     - TSH; Future  - COMP METABOLIC PANEL; Future  - LIPID PROFILE; Future  - CBC WITH DIFFERENTIAL; Future    6. Primary osteoarthritis of both knees        Under good control. Continue same regimen.          30 minute face-to-face encounter took place today.  More than half of this time was spent in the coordination of care of the above problems, as well as counseling.

## 2018-09-10 DIAGNOSIS — I10 ESSENTIAL HYPERTENSION: ICD-10-CM

## 2018-09-10 RX ORDER — METOPROLOL TARTRATE 50 MG/1
50 TABLET, FILM COATED ORAL 2 TIMES DAILY
Qty: 180 TAB | Refills: 4 | Status: SHIPPED | OUTPATIENT
Start: 2018-09-10 | End: 2018-10-31 | Stop reason: SDUPTHER

## 2018-09-24 ENCOUNTER — TELEPHONE (OUTPATIENT)
Dept: MEDICAL GROUP | Age: 58
End: 2018-09-24

## 2018-09-24 NOTE — TELEPHONE ENCOUNTER
Phone Number Called: [phone] (home)       Message: please call with pharmacy location change    Left Message for patient to call back: yes

## 2018-10-31 DIAGNOSIS — E78.5 HYPERLIPIDEMIA, UNSPECIFIED HYPERLIPIDEMIA TYPE: ICD-10-CM

## 2018-10-31 DIAGNOSIS — I10 ESSENTIAL HYPERTENSION: ICD-10-CM

## 2018-10-31 RX ORDER — SODIUM PHOSPHATE,MONO-DIBASIC 19G-7G/118
500 ENEMA (ML) RECTAL
Qty: 270 CAP | Refills: 4 | Status: SHIPPED | OUTPATIENT
Start: 2018-10-31

## 2018-10-31 RX ORDER — FENOFIBRATE 160 MG/1
160 TABLET ORAL
Qty: 90 TAB | Refills: 4 | Status: SHIPPED | OUTPATIENT
Start: 2018-10-31 | End: 2019-11-15 | Stop reason: SDUPTHER

## 2018-10-31 RX ORDER — HYDROCHLOROTHIAZIDE 25 MG/1
25 TABLET ORAL
Qty: 90 TAB | Refills: 4 | Status: SHIPPED | OUTPATIENT
Start: 2018-10-31 | End: 2019-11-15 | Stop reason: SDUPTHER

## 2018-10-31 RX ORDER — METOPROLOL TARTRATE 50 MG/1
50 TABLET, FILM COATED ORAL 2 TIMES DAILY
Qty: 180 TAB | Refills: 4 | Status: SHIPPED | OUTPATIENT
Start: 2018-10-31 | End: 2019-11-14 | Stop reason: SDUPTHER

## 2018-10-31 RX ORDER — LISINOPRIL 10 MG/1
10 TABLET ORAL
Qty: 90 TAB | Refills: 4 | Status: SHIPPED | OUTPATIENT
Start: 2018-10-31 | End: 2019-11-21 | Stop reason: SDUPTHER

## 2018-10-31 NOTE — TELEPHONE ENCOUNTER
Was the patient seen in the last year in this department? Yes    Does patient have an active prescription for medications requested? Yes    Received Request Via: Patient           Patient is changing pharmacies and would like to  paper prescriptions to take to new pharmacy

## 2019-03-09 DIAGNOSIS — E87.6 POTASSIUM (K) DEFICIENCY: ICD-10-CM

## 2019-03-11 RX ORDER — POTASSIUM CHLORIDE 750 MG/1
CAPSULE, EXTENDED RELEASE ORAL
Qty: 180 CAP | Refills: 4 | Status: SHIPPED | OUTPATIENT
Start: 2019-03-11 | End: 2020-03-17

## 2019-05-17 ENCOUNTER — APPOINTMENT (OUTPATIENT)
Dept: MEDICAL GROUP | Age: 59
End: 2019-05-17
Payer: COMMERCIAL

## 2019-06-14 ENCOUNTER — TELEPHONE (OUTPATIENT)
Dept: MEDICAL GROUP | Age: 59
End: 2019-06-14

## 2019-06-14 DIAGNOSIS — Z23 NEED FOR VACCINATION: ICD-10-CM

## 2019-06-14 NOTE — TELEPHONE ENCOUNTER
Patient is on the MA Schedule 6/17/19 for TDAP vaccine/injection.    SPECIFIC Action To Be Taken: Orders pending, please sign.

## 2019-06-17 ENCOUNTER — NON-PROVIDER VISIT (OUTPATIENT)
Dept: MEDICAL GROUP | Age: 59
End: 2019-06-17
Payer: COMMERCIAL

## 2019-06-17 ENCOUNTER — TELEPHONE (OUTPATIENT)
Dept: MEDICAL GROUP | Age: 59
End: 2019-06-17

## 2019-06-17 DIAGNOSIS — E78.2 MIXED HYPERLIPIDEMIA: ICD-10-CM

## 2019-06-17 DIAGNOSIS — Z00.00 HEALTHCARE MAINTENANCE: ICD-10-CM

## 2019-06-17 DIAGNOSIS — Z12.31 SCREENING MAMMOGRAM, ENCOUNTER FOR: ICD-10-CM

## 2019-06-17 DIAGNOSIS — Z23 NEED FOR VACCINATION: ICD-10-CM

## 2019-06-17 DIAGNOSIS — R73.01 IFG (IMPAIRED FASTING GLUCOSE): ICD-10-CM

## 2019-06-17 DIAGNOSIS — I10 ESSENTIAL HYPERTENSION: ICD-10-CM

## 2019-06-17 PROCEDURE — 90471 IMMUNIZATION ADMIN: CPT | Performed by: PHYSICIAN ASSISTANT

## 2019-06-17 PROCEDURE — 90715 TDAP VACCINE 7 YRS/> IM: CPT | Performed by: PHYSICIAN ASSISTANT

## 2019-06-17 NOTE — PROGRESS NOTES
"Desirae Paris is a 58 y.o. female here for a non-provider visit for:   TDAP    Reason for immunization: continue or complete series started at the office  Immunization records indicate need for vaccine: Yes, confirmed with Epic  Minimum interval has been met for this vaccine: Yes  ABN completed: Not Indicated    Order and dose verified by: DONNELL  VIS Dated  2-24-15 was given to patient: Yes  All IAC Questionnaire questions were answered \"No.\"    Patient tolerated injection and no adverse effects were observed or reported: Yes    Pt scheduled for next dose in series: No    "

## 2019-06-17 NOTE — TELEPHONE ENCOUNTER
Pt came in for an MA VISIT for a TDAP vaccine, and wanted to make an appt to be seen for blood work, pt is requesting routine blood work as well as an order for a mammo. Pt wants it faxed to her at [phone].  Please advice, Next appt scheduled is 7/12/19.

## 2019-06-28 LAB
ALBUMIN SERPL-MCNC: 4.3 G/DL (ref 3.5–5.5)
ALBUMIN/GLOB SERPL: 1.7 {RATIO} (ref 1.2–2.2)
ALP SERPL-CCNC: 54 IU/L (ref 39–117)
ALT SERPL-CCNC: 21 IU/L (ref 0–32)
AST SERPL-CCNC: 20 IU/L (ref 0–40)
BASOPHILS # BLD AUTO: 0 X10E3/UL (ref 0–0.2)
BASOPHILS NFR BLD AUTO: 0 %
BILIRUB SERPL-MCNC: 0.4 MG/DL (ref 0–1.2)
BUN SERPL-MCNC: 15 MG/DL (ref 6–24)
BUN/CREAT SERPL: 15 (ref 9–23)
CALCIUM SERPL-MCNC: 10.2 MG/DL (ref 8.7–10.2)
CHLORIDE SERPL-SCNC: 104 MMOL/L (ref 96–106)
CHOLEST SERPL-MCNC: 150 MG/DL (ref 100–199)
CO2 SERPL-SCNC: 23 MMOL/L (ref 20–29)
CREAT SERPL-MCNC: 0.97 MG/DL (ref 0.57–1)
EOSINOPHIL # BLD AUTO: 0.1 X10E3/UL (ref 0–0.4)
EOSINOPHIL NFR BLD AUTO: 3 %
ERYTHROCYTE [DISTWIDTH] IN BLOOD BY AUTOMATED COUNT: 13.4 % (ref 12.3–15.4)
GLOBULIN SER CALC-MCNC: 2.5 G/DL (ref 1.5–4.5)
GLUCOSE SERPL-MCNC: 100 MG/DL (ref 65–99)
HBA1C MFR BLD: 5.9 % (ref 4.8–5.6)
HCT VFR BLD AUTO: 43.5 % (ref 34–46.6)
HDLC SERPL-MCNC: 47 MG/DL
HGB BLD-MCNC: 14.7 G/DL (ref 11.1–15.9)
IMM GRANULOCYTES # BLD AUTO: 0 X10E3/UL (ref 0–0.1)
IMM GRANULOCYTES NFR BLD AUTO: 0 %
IMMATURE CELLS  115398: NORMAL
LABORATORY COMMENT REPORT: ABNORMAL
LDLC SERPL CALC-MCNC: 71 MG/DL (ref 0–99)
LYMPHOCYTES # BLD AUTO: 2 X10E3/UL (ref 0.7–3.1)
LYMPHOCYTES NFR BLD AUTO: 42 %
MCH RBC QN AUTO: 30.4 PG (ref 26.6–33)
MCHC RBC AUTO-ENTMCNC: 33.8 G/DL (ref 31.5–35.7)
MCV RBC AUTO: 90 FL (ref 79–97)
MONOCYTES # BLD AUTO: 0.3 X10E3/UL (ref 0.1–0.9)
MONOCYTES NFR BLD AUTO: 7 %
MORPHOLOGY BLD-IMP: NORMAL
NEUTROPHILS # BLD AUTO: 2.2 X10E3/UL (ref 1.4–7)
NEUTROPHILS NFR BLD AUTO: 48 %
NRBC BLD AUTO-RTO: NORMAL %
PLATELET # BLD AUTO: 307 X10E3/UL (ref 150–450)
POTASSIUM SERPL-SCNC: 4.3 MMOL/L (ref 3.5–5.2)
PROT SERPL-MCNC: 6.8 G/DL (ref 6–8.5)
RBC # BLD AUTO: 4.84 X10E6/UL (ref 3.77–5.28)
SODIUM SERPL-SCNC: 141 MMOL/L (ref 134–144)
TRIGL SERPL-MCNC: 162 MG/DL (ref 0–149)
TSH SERPL DL<=0.005 MIU/L-ACNC: 1.92 UIU/ML (ref 0.45–4.5)
VLDLC SERPL CALC-MCNC: 32 MG/DL (ref 5–40)
WBC # BLD AUTO: 4.7 X10E3/UL (ref 3.4–10.8)

## 2019-07-12 ENCOUNTER — OFFICE VISIT (OUTPATIENT)
Dept: MEDICAL GROUP | Age: 59
End: 2019-07-12
Payer: COMMERCIAL

## 2019-07-12 VITALS
TEMPERATURE: 97.6 F | DIASTOLIC BLOOD PRESSURE: 76 MMHG | BODY MASS INDEX: 33.71 KG/M2 | HEART RATE: 68 BPM | HEIGHT: 67 IN | OXYGEN SATURATION: 100 % | SYSTOLIC BLOOD PRESSURE: 124 MMHG | WEIGHT: 214.8 LBS

## 2019-07-12 DIAGNOSIS — R29.898 KNEE CLICKING: ICD-10-CM

## 2019-07-12 DIAGNOSIS — E66.9 OBESITY (BMI 30.0-34.9): ICD-10-CM

## 2019-07-12 DIAGNOSIS — E78.2 MIXED HYPERLIPIDEMIA: ICD-10-CM

## 2019-07-12 DIAGNOSIS — R73.01 IFG (IMPAIRED FASTING GLUCOSE): ICD-10-CM

## 2019-07-12 DIAGNOSIS — I10 ESSENTIAL HYPERTENSION: ICD-10-CM

## 2019-07-12 PROCEDURE — 99214 OFFICE O/P EST MOD 30 MIN: CPT | Performed by: INTERNAL MEDICINE

## 2019-07-12 ASSESSMENT — PATIENT HEALTH QUESTIONNAIRE - PHQ9: CLINICAL INTERPRETATION OF PHQ2 SCORE: 0

## 2019-07-12 ASSESSMENT — ENCOUNTER SYMPTOMS
NEUROLOGICAL NEGATIVE: 1
CARDIOVASCULAR NEGATIVE: 1
RESPIRATORY NEGATIVE: 1
CONSTITUTIONAL NEGATIVE: 1
PSYCHIATRIC NEGATIVE: 1
GASTROINTESTINAL NEGATIVE: 1
MUSCULOSKELETAL NEGATIVE: 1
EYES NEGATIVE: 1

## 2019-07-12 NOTE — PROGRESS NOTES
Subjective:      Desirae Paris is a 59 y.o. female who presents with Hypertension (follow up)      HPI  The patient is here for followup of chronic medical problems listed below. The patient is compliant with medications and having no side effects from them. Denies chest pain, abdominal pain, dyspnea, myalgias, or cough.    Patient is doing well with no new complaints, other than a new clicking/popping sound she hears on her right leg with ambulating. She reports some very minor discomfort to her knee but otherwise denies knee pain. She would like a referral to see physical therapist.     History of hypercholesterolemia, IFG, and obesity. A1c is elevated at 5.9 and indicative of prediabetes. Triglycerides are borderline elevated at 162, but otherwise stable and well controlled with current regimens. She is taking Fenofibrate 160 mg.     Blood pressure is stable currently. She is taking Lisinopril 10 mg, Hydrochlorothiazide 25 mg, and Metoprolol 50 mg without side effects. She will continue to monitor her blood pressure at home.       Patient Active Problem List   Diagnosis   • Essential hypertension   • IFG (impaired fasting glucose)   • Mixed hyperlipidemia   • Potassium (K) deficiency   • Obesity (BMI 30.0-34.9)   • Primary osteoarthritis of both knees       Outpatient Medications Prior to Visit   Medication Sig Dispense Refill   • potassium chloride (MICRO-K) 10 MEQ capsule TAKE 1 CAPSULE BY MOUTH TWICE A  Cap 4   • fenofibrate (TRIGLIDE) 160 MG tablet Take 1 Tab by mouth every day. 90 Tab 4   • glucosamine Sulfate 500 MG Cap Take 1 Cap by mouth 3 times a day, with meals. 270 Cap 4   • hydroCHLOROthiazide (HYDRODIURIL) 25 MG Tab Take 1 Tab by mouth every day. 90 Tab 4   • lisinopril (PRINIVIL) 10 MG Tab Take 1 Tab by mouth every day. 90 Tab 4   • metoprolol (LOPRESSOR) 50 MG Tab Take 1 Tab by mouth 2 times a day. 180 Tab 4   • Omega-3 Fatty Acids (FISH OIL) 1000 MG CAPS capsule Take 1,000 mg by mouth 3  "times a day, with meals.     • vitamin D (CHOLECALCIFEROL) 1000 UNIT TABS Take 2 Tabs by mouth every day. 30 Tab 11     No facility-administered medications prior to visit.         No Known Allergies    Review of Systems   Constitutional: Negative.    HENT: Negative.    Eyes: Negative.    Respiratory: Negative.    Cardiovascular: Negative.    Gastrointestinal: Negative.    Genitourinary: Negative.    Musculoskeletal: Negative.    Skin: Negative.    Neurological: Negative.    Endo/Heme/Allergies: Negative.    Psychiatric/Behavioral: Negative.    All other systems reviewed and are negative.     Objective:     /76 (BP Location: Left arm, Patient Position: Sitting, BP Cuff Size: Adult long)   Pulse 68   Temp 36.4 °C (97.6 °F) (Temporal)   Ht 1.708 m (5' 7.24\")   Wt 97.4 kg (214 lb 12.8 oz)   SpO2 100%   BMI 33.40 kg/m²     Physical Exam   Constitutional: Oriented to person, place, and time. Appears well-developed and well-nourished. No distress.   Head: Normocephalic and atraumatic.   Right Ear: External ear normal.   Left Ear: External ear normal.   Nose: Nose normal.   Mouth/Throat: Oropharynx is clear and moist. No oropharyngeal exudate.   Eyes: Pupils are equal, round, and reactive to light. Conjunctivae and EOM are normal. Right eye exhibits no discharge. Left eye exhibits no discharge. No scleral icterus.   Neck: Normal range of motion. Neck supple. No JVD present. No tracheal deviation present. No thyromegaly present.   Cardiovascular: Normal rate, regular rhythm, normal heart sounds and intact distal pulses.  Exam reveals no gallop and no friction rub.    No murmur heard.  Pulmonary/Chest: Effort normal. No stridor. No respiratory distress. No wheezing or rales. No tenderness.   Abdominal: Soft. Bowel sounds are normal. No distension and no mass. There is no tenderness. There is no rebound and no guarding. No hernia.   Musculoskeletal: Normal range of motion No edema or tenderness.   Lymphadenopathy: " No cervical adenopathy.   Neurological: Alert and oriented to person, place, and time. Normal reflexes. Normal reflexes. No cranial nerve deficit. Normal muscle tone. Coordination normal.   Skin: Skin is warm and dry. No rash noted. Not diaphoretic. No erythema. No pallor.   Psychiatric: Normal mood and affect. Behavior is normal. Judgment and thought content normal.   Nursing note and vitals reviewed.      Lab Results   Component Value Date/Time    HBA1C 5.9 (H) 06/27/2019 09:00 AM     Lab Results   Component Value Date/Time    SODIUM 141 06/27/2019 09:00 AM    SODIUM 136 02/01/2013 09:56 AM    POTASSIUM 4.3 06/27/2019 09:00 AM    POTASSIUM 3.9 02/01/2013 09:56 AM    CHLORIDE 104 06/27/2019 09:00 AM    CHLORIDE 99 02/01/2013 09:56 AM    CO2 23 06/27/2019 09:00 AM    CO2 23 02/01/2013 09:56 AM    GLUCOSE 100 (H) 06/27/2019 09:00 AM    GLUCOSE 91 02/01/2013 09:56 AM    BUN 15 06/27/2019 09:00 AM    BUN 14 02/01/2013 09:56 AM    CREATININE 0.97 06/27/2019 09:00 AM    CREATININE 1.04 (H) 02/01/2013 09:56 AM    BUNCREATRAT 15 06/27/2019 09:00 AM    BUNCREATRAT 13 02/01/2013 09:56 AM    ALKPHOSPHAT 54 06/27/2019 09:00 AM    ALKPHOSPHAT 51 02/01/2013 09:56 AM    ASTSGOT 20 06/27/2019 09:00 AM    ASTSGOT 17 02/01/2013 09:56 AM    ALTSGPT 21 06/27/2019 09:00 AM    ALTSGPT 14 02/01/2013 09:56 AM    TBILIRUBIN 0.4 06/27/2019 09:00 AM    TBILIRUBIN 0.9 02/01/2013 09:56 AM     No results found for: INR  Lab Results   Component Value Date/Time    CHOLSTRLTOT 150 06/27/2019 09:00 AM    CHOLSTRLTOT 155 02/01/2013 09:56 AM    LDL 71 06/27/2019 09:00 AM    LDL 77 02/01/2013 09:56 AM    HDL 47 06/27/2019 09:00 AM    HDL 51 02/01/2013 09:56 AM    TRIGLYCERIDE 162 (H) 06/27/2019 09:00 AM    TRIGLYCERIDE 136 02/01/2013 09:56 AM       No results found for: TESTOSTERONE  Lab Results   Component Value Date/Time    TSH 1.920 06/27/2019 09:00 AM    TSH 2.820 05/08/2017 08:29 AM     No results found for: FREET4  No results found for:  URICACID  No components found for: VITB12  Lab Results   Component Value Date/Time    25HYDROXY 42.4 04/18/2014 08:39 AM    25HYDROXY 38.7 02/24/2012 10:19 AM       Assessment/Plan:     1. Knee clicking- right  - New acute complaint. Patient notes a popping/clicking sound but no pain. She would like a referral to see ortho for further evaluation. Imaging ordered.   - Discussed treating with anti-inflammatories if she is experiencing knee pain. She does not feel this is necessary currently but would like to see physical therapy.   - DX-KNEE COMPLETE 4+ RIGHT; Future  - REFERRAL TO ORTHOPEDICS  - REFERRAL TO PHYSICAL THERAPY Reason for Therapy: Eval/Treat/Report    2. Essential hypertension  - Under good control. Continue current regimens, same doses of Lisinopril, Hydrochlorothiazide, and Metoprolol.  - Continue potassium chloride.   - Discussed to eat low-sodium diet and encouraged to do regular physical exercise.  - Recommend to monitor blood pressure and heart rate at home.    3. IFG (impaired fasting glucose)  - Elevated A1c. Patient still has prediabetes.  - Advised to avoid processed sugars. Diet/exercise/lose 15 lbs.; patient counseled   - HEMOGLOBIN A1C; Future    4. Mixed hyperlipidemia  - Triglycerides borderline elevated, but otherwise under good control. Continue current regimens, Fenofibrate 160 mg.   - Reviewed the risks and benefits of treatment and potential side effects of medication.  - Advised to eat low fat, low carbohydrate and high fiber diet as well as do cardio physical exercise regularly.   - Comp Metabolic Panel; Future  - Lipid Profile; Future  - CBC WITH DIFFERENTIAL; Future    5. Obesity (BMI 30.0-34.9)   - Patient counseled on diet/exercise/lose 15 lbs.      30 minute face-to-face encounter took place today.  More than half of this time was spent in the coordination of care of the above problems, as well as counseling.     Laura SIDDIQUI (Tamiko), am scribing for, and in the  presence of, Abran Olguin M.D..    Electronically signed by: Laura Harrison (Scribe), 7/12/2019    I, Abran Olguin M.D., personally performed the services described in this documentation, as scribed by Laura Harrison in my presence, and it is both accurate and complete.

## 2019-07-16 ENCOUNTER — HOSPITAL ENCOUNTER (OUTPATIENT)
Dept: RADIOLOGY | Facility: MEDICAL CENTER | Age: 59
End: 2019-07-16
Attending: INTERNAL MEDICINE
Payer: COMMERCIAL

## 2019-07-16 DIAGNOSIS — Z12.31 VISIT FOR SCREENING MAMMOGRAM: ICD-10-CM

## 2019-07-16 PROCEDURE — 77063 BREAST TOMOSYNTHESIS BI: CPT

## 2019-08-07 ENCOUNTER — HOSPITAL ENCOUNTER (OUTPATIENT)
Dept: RADIOLOGY | Facility: MEDICAL CENTER | Age: 59
End: 2019-08-07
Attending: INTERNAL MEDICINE
Payer: COMMERCIAL

## 2019-08-07 DIAGNOSIS — R29.898 KNEE CLICKING: ICD-10-CM

## 2019-08-07 PROCEDURE — 73564 X-RAY EXAM KNEE 4 OR MORE: CPT | Mod: RT

## 2020-01-11 LAB
ALBUMIN SERPL-MCNC: 4.7 G/DL (ref 3.5–5.5)
ALBUMIN/GLOB SERPL: 2.1 {RATIO} (ref 1.2–2.2)
ALP SERPL-CCNC: 58 IU/L (ref 39–117)
ALT SERPL-CCNC: 21 IU/L (ref 0–32)
AST SERPL-CCNC: 19 IU/L (ref 0–40)
BASOPHILS # BLD AUTO: 0 X10E3/UL (ref 0–0.2)
BASOPHILS NFR BLD AUTO: 1 %
BILIRUB SERPL-MCNC: 0.4 MG/DL (ref 0–1.2)
BUN SERPL-MCNC: 20 MG/DL (ref 6–24)
BUN/CREAT SERPL: 21 (ref 9–23)
CALCIUM SERPL-MCNC: 10 MG/DL (ref 8.7–10.2)
CHLORIDE SERPL-SCNC: 100 MMOL/L (ref 96–106)
CHOLEST SERPL-MCNC: 163 MG/DL (ref 100–199)
CO2 SERPL-SCNC: 24 MMOL/L (ref 20–29)
CREAT SERPL-MCNC: 0.97 MG/DL (ref 0.57–1)
EOSINOPHIL # BLD AUTO: 0.1 X10E3/UL (ref 0–0.4)
EOSINOPHIL NFR BLD AUTO: 2 %
ERYTHROCYTE [DISTWIDTH] IN BLOOD BY AUTOMATED COUNT: 13.3 % (ref 11.7–15.4)
GLOBULIN SER CALC-MCNC: 2.2 G/DL (ref 1.5–4.5)
GLUCOSE SERPL-MCNC: 102 MG/DL (ref 65–99)
HBA1C MFR BLD: 5.8 % (ref 4.8–5.6)
HCT VFR BLD AUTO: 45.2 % (ref 34–46.6)
HDLC SERPL-MCNC: 55 MG/DL
HGB BLD-MCNC: 15.4 G/DL (ref 11.1–15.9)
IMM GRANULOCYTES # BLD AUTO: 0 X10E3/UL (ref 0–0.1)
IMM GRANULOCYTES NFR BLD AUTO: 0 %
IMMATURE CELLS  115398: NORMAL
LABORATORY COMMENT REPORT: NORMAL
LDLC SERPL CALC-MCNC: 80 MG/DL (ref 0–99)
LYMPHOCYTES # BLD AUTO: 1.9 X10E3/UL (ref 0.7–3.1)
LYMPHOCYTES NFR BLD AUTO: 36 %
MCH RBC QN AUTO: 30.4 PG (ref 26.6–33)
MCHC RBC AUTO-ENTMCNC: 34.1 G/DL (ref 31.5–35.7)
MCV RBC AUTO: 89 FL (ref 79–97)
MONOCYTES # BLD AUTO: 0.5 X10E3/UL (ref 0.1–0.9)
MONOCYTES NFR BLD AUTO: 10 %
MORPHOLOGY BLD-IMP: NORMAL
NEUTROPHILS # BLD AUTO: 2.7 X10E3/UL (ref 1.4–7)
NEUTROPHILS NFR BLD AUTO: 51 %
NRBC BLD AUTO-RTO: NORMAL %
PLATELET # BLD AUTO: 291 X10E3/UL (ref 150–450)
POTASSIUM SERPL-SCNC: 4.6 MMOL/L (ref 3.5–5.2)
PROT SERPL-MCNC: 6.9 G/DL (ref 6–8.5)
RBC # BLD AUTO: 5.06 X10E6/UL (ref 3.77–5.28)
SODIUM SERPL-SCNC: 139 MMOL/L (ref 134–144)
TRIGL SERPL-MCNC: 142 MG/DL (ref 0–149)
VLDLC SERPL CALC-MCNC: 28 MG/DL (ref 5–40)
WBC # BLD AUTO: 5.2 X10E3/UL (ref 3.4–10.8)

## 2020-01-20 ENCOUNTER — OFFICE VISIT (OUTPATIENT)
Dept: MEDICAL GROUP | Age: 60
End: 2020-01-20
Payer: COMMERCIAL

## 2020-01-20 VITALS
BODY MASS INDEX: 34.72 KG/M2 | WEIGHT: 221.2 LBS | TEMPERATURE: 97.8 F | HEIGHT: 67 IN | HEART RATE: 60 BPM | DIASTOLIC BLOOD PRESSURE: 88 MMHG | SYSTOLIC BLOOD PRESSURE: 138 MMHG | OXYGEN SATURATION: 100 %

## 2020-01-20 DIAGNOSIS — R73.01 IFG (IMPAIRED FASTING GLUCOSE): ICD-10-CM

## 2020-01-20 DIAGNOSIS — I10 ESSENTIAL HYPERTENSION: ICD-10-CM

## 2020-01-20 DIAGNOSIS — E87.6 POTASSIUM (K) DEFICIENCY: ICD-10-CM

## 2020-01-20 DIAGNOSIS — E78.2 MIXED HYPERLIPIDEMIA: ICD-10-CM

## 2020-01-20 DIAGNOSIS — E66.9 OBESITY (BMI 30.0-34.9): ICD-10-CM

## 2020-01-20 PROCEDURE — 99214 OFFICE O/P EST MOD 30 MIN: CPT | Performed by: INTERNAL MEDICINE

## 2020-01-20 ASSESSMENT — ENCOUNTER SYMPTOMS
CONSTITUTIONAL NEGATIVE: 1
GASTROINTESTINAL NEGATIVE: 1
RESPIRATORY NEGATIVE: 1
CARDIOVASCULAR NEGATIVE: 1
NEUROLOGICAL NEGATIVE: 1
EYES NEGATIVE: 1
MUSCULOSKELETAL NEGATIVE: 1
PSYCHIATRIC NEGATIVE: 1

## 2020-01-20 ASSESSMENT — PATIENT HEALTH QUESTIONNAIRE - PHQ9: CLINICAL INTERPRETATION OF PHQ2 SCORE: 0

## 2020-01-20 NOTE — PROGRESS NOTES
Subjective:      Desirae Membreno is a 59 y.o. female who presents with Hyperlipidemia (lab review)        HPI    The patient is here for followup of chronic medical problems listed below. The patient is compliant with medications and having no side effects from them. Denies chest pain, abdominal pain, dyspnea, myalgias, or cough.    1. Essential hypertension  Patient has a history of chronic hypertension and is taking Prinivil 10 mg daily, Lopressor 50 mg twice daily, and Hydrodiuril 25 mg daily. No medication side effects were reported. She reportedly monitors her blood pressure regularly at home. Blood pressure is okay today at 138/88.  She reports following a low sodium diet and denies any related complaints including chronic cough, chest pain, headaches or dizziness.     2. IFG (impaired fasting glucose)  Chronic history of impaired fasting glucose. Glycohemoglobin was last 5.8 on 01/10/20. Glucose is currently managed via diet modification and exercise.     3. Mixed hyperlipidemia  The patient has a chronic history of mixed hyperlipidemia. Currently taking Triglide 160 mg once daily. No medication side effects were reported including myalgias or abdominal pain. She follows a high protein, low carbohydrate diet and she exercises regularly. No acute complaints of dizziness, claudication or chest pain. Last labs completed on 01/10/20 as shown below.     Results for DESIRAE MEMBRENO (MRN [id number]) as of 1/20/2020 09:28   Ref. Range 1/10/2020 06:20   Cholesterol,Tot Latest Ref Range: 100 - 199 mg/dL 163   Triglycerides Latest Ref Range: 0 - 149 mg/dL 142   HDL Latest Ref Range: >39 mg/dL 55   LDL Latest Ref Range: 0 - 99 mg/dL 80   VLDL Cholesterol Calc Latest Ref Range: 5 - 40 mg/dL 28       4. Potassium (K) deficiency  Patient has a history of vitamin K deficiency and is taking Micro-K 10 mEq. Vitamin K level was last evaluated on 01/10/20.    5. Obesity (BMI 30.0-34.9)  The patient does not report any weight-loss  "regimen.       Patient Active Problem List   Diagnosis   • Essential hypertension   • IFG (impaired fasting glucose)   • Mixed hyperlipidemia   • Potassium (K) deficiency   • Obesity (BMI 30.0-34.9)   • Primary osteoarthritis of both knees   • Knee clicking- right       Outpatient Medications Prior to Visit   Medication Sig Dispense Refill   • lisinopril (PRINIVIL) 10 MG Tab TAKE 1 TABLET BY MOUTH EVERY DAY 90 Tab 4   • hydroCHLOROthiazide (HYDRODIURIL) 25 MG Tab Take 1 Tab by mouth every day. 90 Tab 4   • fenofibrate (TRIGLIDE) 160 MG tablet TAKE 1 TABLET BY MOUTH EVERY DAY 30 Tab 4   • metoprolol (LOPRESSOR) 50 MG Tab Take 1 Tab by mouth 2 times a day. 180 Tab 4   • potassium chloride (MICRO-K) 10 MEQ capsule TAKE 1 CAPSULE BY MOUTH TWICE A  Cap 4   • glucosamine Sulfate 500 MG Cap Take 1 Cap by mouth 3 times a day, with meals. 270 Cap 4   • Omega-3 Fatty Acids (FISH OIL) 1000 MG CAPS capsule Take 1,000 mg by mouth 3 times a day, with meals.     • vitamin D (CHOLECALCIFEROL) 1000 UNIT TABS Take 2 Tabs by mouth every day. 30 Tab 11     No facility-administered medications prior to visit.         No Known Allergies    Review of Systems   Constitutional: Negative.    HENT: Negative.    Eyes: Negative.    Respiratory: Negative.    Cardiovascular: Negative.    Gastrointestinal: Negative.    Genitourinary: Negative.    Musculoskeletal: Negative.    Skin: Negative.    Neurological: Negative.    Endo/Heme/Allergies: Negative.    Psychiatric/Behavioral: Negative.       Objective:     /88 (BP Location: Left arm, Patient Position: Sitting, BP Cuff Size: Adult)   Pulse 60   Temp 36.6 °C (97.8 °F) (Temporal)   Ht 1.708 m (5' 7.24\")   Wt 100.3 kg (221 lb 3.2 oz)   SpO2 100%   BMI 34.40 kg/m²     Physical Exam   Constitutional: Oriented to person, place, and time. Appears well-developed and well-nourished. No distress.   Head: Normocephalic and atraumatic.   Right Ear: External ear normal.   Left Ear: External " ear normal.   Nose: Nose normal.   Mouth/Throat: Oropharynx is clear and moist. No oropharyngeal exudate.   Eyes: Pupils are equal, round, and reactive to light. Conjunctivae and EOM are normal. Right eye exhibits no discharge. Left eye exhibits no discharge. No scleral icterus.   Neck: Normal range of motion. Neck supple. No JVD present. No tracheal deviation present. No thyromegaly present.   Cardiovascular: Normal rate, regular rhythm, normal heart sounds and intact distal pulses.  Exam reveals no gallop and no friction rub.    No murmur heard.  Pulmonary/Chest: Effort normal. No stridor. No respiratory distress. No wheezing or rales. No tenderness.   Abdominal: Soft. Bowel sounds are normal. No distension and no mass. There is no tenderness. There is no rebound and no guarding. No hernia.   Musculoskeletal: Normal range of motion No edema or tenderness.   Lymphadenopathy: No cervical adenopathy.   Neurological: Alert and oriented to person, place, and time. Normal reflexes. Normal reflexes. No cranial nerve deficit. Normal muscle tone. Coordination normal.   Skin: Skin is warm and dry. No rash noted. Not diaphoretic. No erythema. No pallor.   Psychiatric: Normal mood and affect. Behavior is normal. Judgment and thought content normal.   Nursing note and vitals reviewed.      Lab Results   Component Value Date/Time    HBA1C 5.8 (H) 01/10/2020 06:20 AM    HBA1C 5.9 (H) 06/27/2019 09:00 AM     Lab Results   Component Value Date/Time    SODIUM 139 01/10/2020 06:20 AM    SODIUM 136 02/01/2013 09:56 AM    POTASSIUM 4.6 01/10/2020 06:20 AM    POTASSIUM 3.9 02/01/2013 09:56 AM    CHLORIDE 100 01/10/2020 06:20 AM    CHLORIDE 99 02/01/2013 09:56 AM    CO2 24 01/10/2020 06:20 AM    CO2 23 02/01/2013 09:56 AM    GLUCOSE 102 (H) 01/10/2020 06:20 AM    GLUCOSE 91 02/01/2013 09:56 AM    BUN 20 01/10/2020 06:20 AM    BUN 14 02/01/2013 09:56 AM    CREATININE 0.97 01/10/2020 06:20 AM    CREATININE 1.04 (H) 02/01/2013 09:56 AM     BUNCREATRAT 21 01/10/2020 06:20 AM    BUNCREATRAT 13 02/01/2013 09:56 AM    ALKPHOSPHAT 58 01/10/2020 06:20 AM    ALKPHOSPHAT 51 02/01/2013 09:56 AM    ASTSGOT 19 01/10/2020 06:20 AM    ASTSGOT 17 02/01/2013 09:56 AM    ALTSGPT 21 01/10/2020 06:20 AM    ALTSGPT 14 02/01/2013 09:56 AM    TBILIRUBIN 0.4 01/10/2020 06:20 AM    TBILIRUBIN 0.9 02/01/2013 09:56 AM     No results found for: INR  Lab Results   Component Value Date/Time    CHOLSTRLTOT 163 01/10/2020 06:20 AM    CHOLSTRLTOT 155 02/01/2013 09:56 AM    LDL 80 01/10/2020 06:20 AM    LDL 77 02/01/2013 09:56 AM    HDL 55 01/10/2020 06:20 AM    HDL 51 02/01/2013 09:56 AM    TRIGLYCERIDE 142 01/10/2020 06:20 AM    TRIGLYCERIDE 136 02/01/2013 09:56 AM       No results found for: TESTOSTERONE  Lab Results   Component Value Date/Time    TSH 1.920 06/27/2019 09:00 AM    TSH 2.820 05/08/2017 08:29 AM     No results found for: FREET4  No results found for: URICACID  No components found for: VITB12  Lab Results   Component Value Date/Time    25HYDROXY 42.4 04/18/2014 08:39 AM    25HYDROXY 38.7 02/24/2012 10:19 AM          Assessment/Plan:     1. Essential hypertension  Chronic, stable history. Under good control with current medication regimen: Prinivil 10 mg daily, Lopressor 50 mg twice daily, and Hydrodiuril 25 mg daily. No changes in dosage today. Blood pressure today was 138/88. Patient was asked to continue monitoring her blood pressure at home. She was encouraged to follow a low sodium diet.   - Repeat labs in 1-2 weeks prior to their next appointment.   - Comp Metabolic Panel; Future  - Lipid Profile; Future  - CBC WITH DIFFERENTIAL; Future    2. IFG (impaired fasting glucose)  - Chronic, stable history. Glycohemoglobin was last 5.8 on labs dated 01/20/20. Plan to continue monitoring with repeat labs 1-2 weeks prior to her next appointment.   - Patient was encouraged to follow a high protein, low fat, low carbohydrate, low processed sugar diet as well as exercise  regularly with cardio.   - HEMOGLOBIN A1C; Future    3. Mixed hyperlipidemia  Well-controlled. Continue current regimen of Triglide 160 mg once daily. Reviewed the risks and benefits of treatment and potential side effects of medication.  - Obtained and reviewed lipid panel dated 01/10/20 which reveals triglycerides of 142, HDL of 55, and LDL of 80.  - Recommended they follow low fat, low carbohydrate and high fiber diet. Additionally, patient was asked to exercise regularly including frequent cardio.  - Recheck lab 1-2 weeks before next follow up visit.  - Comp Metabolic Panel; Future  - Lipid Profile; Future  - CBC WITH DIFFERENTIAL; Future    4. Potassium (K) deficiency  Prior history of vitamin K deficiency. Plan to continue current treatment of Micro-K 10 mEq units of vitamin K. Repeat labs will be completed 1-2 weeks prior to their next follow up appointment for continued management.    5. Obesity (BMI 30.0-34.9)  I will follow up at next visit with weight loss progress.        Antoni SIDDIQUI (Tamiko), am scribing for, and in the presence of, Abran Olguin M.D..    Electronically signed by: Antoni Stevens (Tamiko), 1/20/2020    IAbran M.D., personally performed the services described in this documentation, as scribed by Antoni Stevens in my presence, and it is both accurate and complete.

## 2020-07-15 LAB
ALBUMIN SERPL-MCNC: 4.4 G/DL (ref 3.8–4.9)
ALBUMIN/GLOB SERPL: 2.1 {RATIO} (ref 1.2–2.2)
ALP SERPL-CCNC: 58 IU/L (ref 39–117)
ALT SERPL-CCNC: 18 IU/L (ref 0–32)
AST SERPL-CCNC: 19 IU/L (ref 0–40)
BASOPHILS # BLD AUTO: 0 X10E3/UL (ref 0–0.2)
BASOPHILS NFR BLD AUTO: 1 %
BILIRUB SERPL-MCNC: 0.4 MG/DL (ref 0–1.2)
BUN SERPL-MCNC: 19 MG/DL (ref 6–24)
BUN/CREAT SERPL: 20 (ref 9–23)
CALCIUM SERPL-MCNC: 10.1 MG/DL (ref 8.7–10.2)
CHLORIDE SERPL-SCNC: 102 MMOL/L (ref 96–106)
CHOLEST SERPL-MCNC: 163 MG/DL (ref 100–199)
CO2 SERPL-SCNC: 23 MMOL/L (ref 20–29)
CREAT SERPL-MCNC: 0.94 MG/DL (ref 0.57–1)
EOSINOPHIL # BLD AUTO: 0.1 X10E3/UL (ref 0–0.4)
EOSINOPHIL NFR BLD AUTO: 2 %
ERYTHROCYTE [DISTWIDTH] IN BLOOD BY AUTOMATED COUNT: 12.6 % (ref 11.7–15.4)
GLOBULIN SER CALC-MCNC: 2.1 G/DL (ref 1.5–4.5)
GLUCOSE SERPL-MCNC: 110 MG/DL (ref 65–99)
HBA1C MFR BLD: 5.7 % (ref 4.8–5.6)
HCT VFR BLD AUTO: 42.6 % (ref 34–46.6)
HDLC SERPL-MCNC: 52 MG/DL
HGB BLD-MCNC: 14.3 G/DL (ref 11.1–15.9)
IMM GRANULOCYTES # BLD AUTO: 0 X10E3/UL (ref 0–0.1)
IMM GRANULOCYTES NFR BLD AUTO: 0 %
IMMATURE CELLS  115398: NORMAL
LABORATORY COMMENT REPORT: ABNORMAL
LDLC SERPL CALC-MCNC: 79 MG/DL (ref 0–99)
LYMPHOCYTES # BLD AUTO: 1.9 X10E3/UL (ref 0.7–3.1)
LYMPHOCYTES NFR BLD AUTO: 36 %
MCH RBC QN AUTO: 29.9 PG (ref 26.6–33)
MCHC RBC AUTO-ENTMCNC: 33.6 G/DL (ref 31.5–35.7)
MCV RBC AUTO: 89 FL (ref 79–97)
MONOCYTES # BLD AUTO: 0.5 X10E3/UL (ref 0.1–0.9)
MONOCYTES NFR BLD AUTO: 9 %
MORPHOLOGY BLD-IMP: NORMAL
NEUTROPHILS # BLD AUTO: 2.7 X10E3/UL (ref 1.4–7)
NEUTROPHILS NFR BLD AUTO: 52 %
NRBC BLD AUTO-RTO: NORMAL %
PLATELET # BLD AUTO: 328 X10E3/UL (ref 150–450)
POTASSIUM SERPL-SCNC: 4.4 MMOL/L (ref 3.5–5.2)
PROT SERPL-MCNC: 6.5 G/DL (ref 6–8.5)
RBC # BLD AUTO: 4.78 X10E6/UL (ref 3.77–5.28)
SODIUM SERPL-SCNC: 142 MMOL/L (ref 134–144)
TRIGL SERPL-MCNC: 162 MG/DL (ref 0–149)
VLDLC SERPL CALC-MCNC: 32 MG/DL (ref 5–40)
WBC # BLD AUTO: 5.2 X10E3/UL (ref 3.4–10.8)

## 2020-07-20 ENCOUNTER — OFFICE VISIT (OUTPATIENT)
Dept: MEDICAL GROUP | Age: 60
End: 2020-07-20
Payer: COMMERCIAL

## 2020-07-20 VITALS
SYSTOLIC BLOOD PRESSURE: 170 MMHG | HEART RATE: 48 BPM | DIASTOLIC BLOOD PRESSURE: 90 MMHG | OXYGEN SATURATION: 98 % | WEIGHT: 209 LBS | BODY MASS INDEX: 32.8 KG/M2 | HEIGHT: 67 IN | TEMPERATURE: 98.1 F

## 2020-07-20 DIAGNOSIS — E78.2 MIXED HYPERLIPIDEMIA: ICD-10-CM

## 2020-07-20 DIAGNOSIS — R73.01 IMPAIRED FASTING GLUCOSE: ICD-10-CM

## 2020-07-20 DIAGNOSIS — I10 ESSENTIAL HYPERTENSION: ICD-10-CM

## 2020-07-20 DIAGNOSIS — E66.9 OBESITY (BMI 30.0-34.9): ICD-10-CM

## 2020-07-20 PROCEDURE — 99214 OFFICE O/P EST MOD 30 MIN: CPT | Performed by: INTERNAL MEDICINE

## 2020-07-20 RX ORDER — LISINOPRIL 20 MG/1
20 TABLET ORAL DAILY
Qty: 90 TAB | Refills: 4 | Status: SHIPPED | OUTPATIENT
Start: 2020-07-20 | End: 2021-03-02 | Stop reason: SDUPTHER

## 2020-07-20 ASSESSMENT — ENCOUNTER SYMPTOMS
NEUROLOGICAL NEGATIVE: 1
RESPIRATORY NEGATIVE: 1
CONSTITUTIONAL NEGATIVE: 1
PSYCHIATRIC NEGATIVE: 1
MUSCULOSKELETAL NEGATIVE: 1
EYES NEGATIVE: 1
CARDIOVASCULAR NEGATIVE: 1
GASTROINTESTINAL NEGATIVE: 1

## 2020-07-20 ASSESSMENT — FIBROSIS 4 INDEX: FIB4 SCORE: 0.79

## 2020-07-20 NOTE — PROGRESS NOTES
Subjective:      Desirae Paris is a 60 y.o. female who presents with Follow-Up and Lab Results  The patient is here for followup of chronic medical problems listed below. The patient is compliant with medications and having no side effects from them. Denies chest pain, abdominal pain, dyspnea, myalgias, or cough.   Patient Active Problem List    Diagnosis Date Noted   • Knee clicking- right 07/12/2019   • Primary osteoarthritis of both knees 05/18/2018   • Obesity (BMI 30.0-34.9) 05/18/2017   • Potassium (K) deficiency 07/06/2016   • Essential hypertension 01/18/2012   • IFG (impaired fasting glucose) 01/18/2012   • Mixed hyperlipidemia 01/18/2012     No Known Allergies  Outpatient Medications Prior to Visit   Medication Sig Dispense Refill   • fenofibrate (TRIGLIDE) 160 MG tablet TAKE 1 TABLET BY MOUTH EVERY DAY 90 Tab 4   • potassium chloride (MICRO-K) 10 MEQ capsule Take 1 Cap by mouth 2 times a day. 180 Cap 4   • hydroCHLOROthiazide (HYDRODIURIL) 25 MG Tab Take 1 Tab by mouth every day. 90 Tab 4   • metoprolol (LOPRESSOR) 50 MG Tab Take 1 Tab by mouth 2 times a day. 180 Tab 4   • glucosamine Sulfate 500 MG Cap Take 1 Cap by mouth 3 times a day, with meals. 270 Cap 4   • Omega-3 Fatty Acids (FISH OIL) 1000 MG CAPS capsule Take 1,000 mg by mouth 3 times a day, with meals.     • vitamin D (CHOLECALCIFEROL) 1000 UNIT TABS Take 2 Tabs by mouth every day. 30 Tab 11   • lisinopril (PRINIVIL) 10 MG Tab TAKE 1 TABLET BY MOUTH EVERY DAY 90 Tab 4     No facility-administered medications prior to visit.                HPI    Review of Systems   Constitutional: Negative.    HENT: Negative.    Eyes: Negative.    Respiratory: Negative.    Cardiovascular: Negative.    Gastrointestinal: Negative.    Genitourinary: Negative.    Musculoskeletal: Negative.    Skin: Negative.    Neurological: Negative.    Endo/Heme/Allergies: Negative.    Psychiatric/Behavioral: Negative.           Objective:     BP (!) 170/90 (BP Location: Right  "arm, Patient Position: Sitting, BP Cuff Size: Adult)   Pulse (!) 48   Temp 36.7 °C (98.1 °F) (Temporal)   Ht 1.708 m (5' 7.24\")   Wt 94.8 kg (209 lb)   SpO2 98%   BMI 32.50 kg/m²      Physical Exam  Vitals signs reviewed.   Constitutional:       General: She is not in acute distress.     Appearance: She is well-developed. She is not diaphoretic.   HENT:      Head: Normocephalic and atraumatic.      Right Ear: External ear normal.      Left Ear: External ear normal.      Nose: Nose normal.      Mouth/Throat:      Pharynx: No oropharyngeal exudate.   Eyes:      General: No scleral icterus.        Right eye: No discharge.         Left eye: No discharge.      Conjunctiva/sclera: Conjunctivae normal.      Pupils: Pupils are equal, round, and reactive to light.   Neck:      Musculoskeletal: Normal range of motion and neck supple.      Thyroid: No thyromegaly.      Vascular: No JVD.      Trachea: No tracheal deviation.   Cardiovascular:      Rate and Rhythm: Normal rate and regular rhythm.      Heart sounds: Normal heart sounds. No murmur. No friction rub. No gallop.    Pulmonary:      Effort: Pulmonary effort is normal. No respiratory distress.      Breath sounds: Normal breath sounds. No stridor. No wheezing or rales.   Chest:      Chest wall: No tenderness.   Abdominal:      General: Bowel sounds are normal. There is no distension.      Palpations: Abdomen is soft. There is no mass.      Tenderness: There is no abdominal tenderness. There is no guarding or rebound.   Musculoskeletal: Normal range of motion.         General: No tenderness.   Lymphadenopathy:      Cervical: No cervical adenopathy.   Skin:     General: Skin is warm and dry.      Coloration: Skin is not pale.      Findings: No erythema or rash.   Neurological:      Mental Status: She is alert and oriented to person, place, and time.      Cranial Nerves: No cranial nerve deficit.      Motor: No abnormal muscle tone.      Coordination: Coordination " normal.      Deep Tendon Reflexes: Reflexes are normal and symmetric. Reflexes normal.   Psychiatric:         Behavior: Behavior normal.         Thought Content: Thought content normal.         Judgment: Judgment normal.                  Assessment/Plan:       1. Essential hypertension-not at goal.  Increase lisinopril to 20 mg daily and recheck in 2 weeks.  Low-salt diet.      - lisinopril (PRINIVIL) 20 MG Tab; Take 1 Tab by mouth every day.  Dispense: 90 Tab; Refill: 4    2. Mixed hyperlipidemia    Under good control. Continue same regimen.'    3. IFG (impaired fasting glucose)    diet/exercise/lose 15 lbs.; patient counseled        4. Obesity (BMI 30.0-34.9)     diet/exercise/lose 15 lbs.; patient counseled

## 2020-08-03 ENCOUNTER — OFFICE VISIT (OUTPATIENT)
Dept: MEDICAL GROUP | Age: 60
End: 2020-08-03
Payer: COMMERCIAL

## 2020-08-03 VITALS
OXYGEN SATURATION: 98 % | HEART RATE: 63 BPM | WEIGHT: 209.4 LBS | DIASTOLIC BLOOD PRESSURE: 80 MMHG | TEMPERATURE: 98 F | SYSTOLIC BLOOD PRESSURE: 122 MMHG | BODY MASS INDEX: 32.87 KG/M2 | HEIGHT: 67 IN

## 2020-08-03 DIAGNOSIS — E78.2 MIXED HYPERLIPIDEMIA: ICD-10-CM

## 2020-08-03 DIAGNOSIS — I10 ESSENTIAL HYPERTENSION: ICD-10-CM

## 2020-08-03 DIAGNOSIS — E66.9 OBESITY (BMI 30.0-34.9): ICD-10-CM

## 2020-08-03 DIAGNOSIS — R73.01 IMPAIRED FASTING GLUCOSE: ICD-10-CM

## 2020-08-03 DIAGNOSIS — E87.6 POTASSIUM (K) DEFICIENCY: ICD-10-CM

## 2020-08-03 PROCEDURE — 99214 OFFICE O/P EST MOD 30 MIN: CPT | Performed by: INTERNAL MEDICINE

## 2020-08-03 ASSESSMENT — ENCOUNTER SYMPTOMS
MUSCULOSKELETAL NEGATIVE: 1
GASTROINTESTINAL NEGATIVE: 1
RESPIRATORY NEGATIVE: 1
EYES NEGATIVE: 1
CARDIOVASCULAR NEGATIVE: 1
NEUROLOGICAL NEGATIVE: 1
CONSTITUTIONAL NEGATIVE: 1
PSYCHIATRIC NEGATIVE: 1

## 2020-08-03 ASSESSMENT — FIBROSIS 4 INDEX: FIB4 SCORE: 0.79

## 2020-08-03 NOTE — PROGRESS NOTES
Subjective:      Desirae Paris is a 60 y.o. female who presents with Follow-Up and Hypertension  The patient is here for followup of chronic medical problems listed below. The patient is compliant with medications and having no side effects from them. Denies chest pain, abdominal pain, dyspnea, myalgias, or cough.   Patient Active Problem List    Diagnosis Date Noted   • Knee clicking- right 07/12/2019   • Primary osteoarthritis of both knees 05/18/2018   • Obesity (BMI 30.0-34.9) 05/18/2017   • Potassium (K) deficiency 07/06/2016   • Essential hypertension 01/18/2012   • IFG (impaired fasting glucose) 01/18/2012   • Mixed hyperlipidemia 01/18/2012     No Known Allergies  Outpatient Medications Prior to Visit   Medication Sig Dispense Refill   • lisinopril (PRINIVIL) 20 MG Tab Take 1 Tab by mouth every day. 90 Tab 4   • fenofibrate (TRIGLIDE) 160 MG tablet TAKE 1 TABLET BY MOUTH EVERY DAY 90 Tab 4   • potassium chloride (MICRO-K) 10 MEQ capsule Take 1 Cap by mouth 2 times a day. 180 Cap 4   • hydroCHLOROthiazide (HYDRODIURIL) 25 MG Tab Take 1 Tab by mouth every day. 90 Tab 4   • metoprolol (LOPRESSOR) 50 MG Tab Take 1 Tab by mouth 2 times a day. 180 Tab 4   • glucosamine Sulfate 500 MG Cap Take 1 Cap by mouth 3 times a day, with meals. 270 Cap 4   • Omega-3 Fatty Acids (FISH OIL) 1000 MG CAPS capsule Take 1,000 mg by mouth 3 times a day, with meals.     • vitamin D (CHOLECALCIFEROL) 1000 UNIT TABS Take 2 Tabs by mouth every day. 30 Tab 11     No facility-administered medications prior to visit.      Orders Only on 07/14/2020   Component Date Value   • WBC 07/14/2020 5.2    • RBC 07/14/2020 4.78    • Hemoglobin 07/14/2020 14.3    • Hematocrit 07/14/2020 42.6    • MCV 07/14/2020 89    • MCH 07/14/2020 29.9    • MCHC 07/14/2020 33.6    • RDW 07/14/2020 12.6    • Platelet Count 07/14/2020 328    • Neutrophils-Polys 07/14/2020 52    • Lymphocytes 07/14/2020 36    • Monocytes 07/14/2020 9    • Eosinophils 07/14/2020 2    •  Basophils 07/14/2020 1    • Immature Cells 07/14/2020 CANCELED    • Neutrophils (Absolute) 07/14/2020 2.7    • Lymphs (Absolute) 07/14/2020 1.9    • Monos (Absolute) 07/14/2020 0.5    • Eos (Absolute) 07/14/2020 0.1    • Baso (Absolute) 07/14/2020 0.0    • Immature Granulocytes 07/14/2020 0    • Immature Granulocytes (a* 07/14/2020 0.0    • Nucleated RBC 07/14/2020 CANCELED    • Comments-Diff 07/14/2020 CANCELED    • Glucose 07/14/2020 110*   • Bun 07/14/2020 19    • Creatinine 07/14/2020 0.94    • GFR If Non  [facility]* 07/14/2020 67    • GFR If  07/14/2020 77    • Bun-Creatinine Ratio 07/14/2020 20    • Sodium 07/14/2020 142    • Potassium 07/14/2020 4.4    • Chloride 07/14/2020 102    • Co2 07/14/2020 23    • Calcium 07/14/2020 10.1    • Total Protein 07/14/2020 6.5    • Albumin 07/14/2020 4.4    • Globulin 07/14/2020 2.1    • A-G Ratio 07/14/2020 2.1    • Total Bilirubin 07/14/2020 0.4    • Alkaline Phosphatase 07/14/2020 58    • AST(SGOT) 07/14/2020 19    • ALT(SGPT) 07/14/2020 18    • Cholesterol,Tot 07/14/2020 163    • Triglycerides 07/14/2020 162*   • HDL 07/14/2020 52    • VLDL Cholesterol Calc 07/14/2020 32    • LDL 07/14/2020 79    • Comment: 07/14/2020 CANCELED    • Glycohemoglobin 07/14/2020 5.7*      Lab Results   Component Value Date/Time    HBA1C 5.7 (H) 07/14/2020 07:10 AM    HBA1C 5.8 (H) 01/10/2020 06:20 AM     Lab Results   Component Value Date/Time    SODIUM 142 07/14/2020 07:10 AM    SODIUM 136 02/01/2013 09:56 AM    POTASSIUM 4.4 07/14/2020 07:10 AM    POTASSIUM 3.9 02/01/2013 09:56 AM    CHLORIDE 102 07/14/2020 07:10 AM    CHLORIDE 99 02/01/2013 09:56 AM    CO2 23 07/14/2020 07:10 AM    CO2 23 02/01/2013 09:56 AM    GLUCOSE 110 (H) 07/14/2020 07:10 AM    GLUCOSE 91 02/01/2013 09:56 AM    BUN 19 07/14/2020 07:10 AM    BUN 14 02/01/2013 09:56 AM    CREATININE 0.94 07/14/2020 07:10 AM    CREATININE 1.04 (H) 02/01/2013 09:56 AM    BUNCREATRAT 20 07/14/2020 07:10 AM     "BUNCREATRAT 13 02/01/2013 09:56 AM    ALKPHOSPHAT 58 07/14/2020 07:10 AM    ALKPHOSPHAT 51 02/01/2013 09:56 AM    ASTSGOT 19 07/14/2020 07:10 AM    ASTSGOT 17 02/01/2013 09:56 AM    ALTSGPT 18 07/14/2020 07:10 AM    ALTSGPT 14 02/01/2013 09:56 AM    TBILIRUBIN 0.4 07/14/2020 07:10 AM    TBILIRUBIN 0.9 02/01/2013 09:56 AM     No results found for: INR  Lab Results   Component Value Date/Time    CHOLSTRLTOT 163 07/14/2020 07:10 AM    CHOLSTRLTOT 155 02/01/2013 09:56 AM    LDL 79 07/14/2020 07:10 AM    LDL 77 02/01/2013 09:56 AM    HDL 52 07/14/2020 07:10 AM    HDL 51 02/01/2013 09:56 AM    TRIGLYCERIDE 162 (H) 07/14/2020 07:10 AM    TRIGLYCERIDE 136 02/01/2013 09:56 AM       No results found for: TESTOSTERONE  Lab Results   Component Value Date/Time    TSH 1.920 06/27/2019 09:00 AM    TSH 2.820 05/08/2017 08:29 AM     No results found for: FREET4  No results found for: URICACID  No components found for: VITB12  Lab Results   Component Value Date/Time    25HYDROXY 42.4 04/18/2014 08:39 AM    25HYDROXY 38.7 02/24/2012 10:19 AM               HPI    Review of Systems   Constitutional: Negative.    HENT: Negative.    Eyes: Negative.    Respiratory: Negative.    Cardiovascular: Negative.    Gastrointestinal: Negative.    Genitourinary: Negative.    Musculoskeletal: Negative.    Skin: Negative.    Neurological: Negative.    Endo/Heme/Allergies: Negative.    Psychiatric/Behavioral: Negative.           Objective:     /80 (BP Location: Right arm, Patient Position: Sitting, BP Cuff Size: Adult)   Pulse 63   Temp 36.7 °C (98 °F) (Temporal)   Ht 1.708 m (5' 7.24\")   Wt 95 kg (209 lb 6.4 oz)   SpO2 98%   BMI 32.56 kg/m²      Physical Exam  Vitals signs and nursing note reviewed.   Constitutional:       Appearance: She is well-developed.   HENT:      Head: Normocephalic and atraumatic.      Right Ear: External ear normal.      Left Ear: External ear normal.      Nose: Nose normal.   Eyes:      General: No scleral " icterus.        Right eye: No discharge.         Left eye: No discharge.      Conjunctiva/sclera: Conjunctivae normal.      Pupils: Pupils are equal, round, and reactive to light.   Neck:      Musculoskeletal: Normal range of motion and neck supple.      Thyroid: No thyromegaly.      Vascular: No JVD.      Trachea: No tracheal deviation.   Cardiovascular:      Rate and Rhythm: Normal rate and regular rhythm.      Heart sounds: Normal heart sounds. No friction rub. No gallop.    Pulmonary:      Effort: Pulmonary effort is normal. No respiratory distress.      Breath sounds: Normal breath sounds. No stridor. No wheezing or rales.   Chest:      Chest wall: No tenderness.   Abdominal:      General: Bowel sounds are normal. There is no distension.      Palpations: Abdomen is soft. There is no mass.      Tenderness: There is no abdominal tenderness. There is no guarding or rebound.      Hernia: No hernia is present.   Musculoskeletal: Normal range of motion.         General: No tenderness.   Lymphadenopathy:      Cervical: No cervical adenopathy.   Skin:     General: Skin is warm and dry.      Coloration: Skin is not pale.      Findings: No erythema or rash.   Neurological:      Mental Status: She is alert and oriented to person, place, and time.      Cranial Nerves: No cranial nerve deficit.      Coordination: Coordination normal.      Deep Tendon Reflexes: Reflexes are normal and symmetric. Reflexes normal.   Psychiatric:         Behavior: Behavior normal.         Thought Content: Thought content normal.         Judgment: Judgment normal.                 Assessment/Plan:       1. Mixed hyperlipidemia  Under good control. Continue same regimen.'     - TSH; Future  - Lipid Profile; Future  - Comp Metabolic Panel; Future  - CBC WITH DIFFERENTIAL; Future    2. IFG (impaired fasting glucose)   Under good control. Continue same regimen.  - HEMOGLOBIN A1C; Future    3. Essential hypertension  Blood pressure much better  controlled since increasing lisinopril to 20 mg daily.  Continue on this dosage and recheck in 3 months.  Continue with low-salt diet.    4. Obesity (BMI 30.0-34.9)    diet/exercise/lose 15 lbs.; patient counseled      5. Potassium (K) deficiency         Under good control. Continue same regimen.

## 2020-08-17 ENCOUNTER — TELEPHONE (OUTPATIENT)
Dept: MEDICAL GROUP | Age: 60
End: 2020-08-17

## 2020-08-17 NOTE — TELEPHONE ENCOUNTER
VOICEMAIL  1. Caller Name:   [id number] - [place] - [address]  [address] [address]  Phone: [phone] Fax: [phone]    2. Message: Needing clarification on dosage previously pt was on 10mg new prescription is 20mg    3. Patient approves office to leave a detailed voicemail/MyChart message: N\A    Phone Number Called:   [id number] - [place] - [address]  [address] [address]  Phone: [phone] Fax: [phone]        Call outcome: Spoke to Pharmacy     Message: informed them that 20mg replaces 10 per Dr. Olguin's note on prescription.

## 2020-11-03 ENCOUNTER — OFFICE VISIT (OUTPATIENT)
Dept: MEDICAL GROUP | Age: 60
End: 2020-11-03
Payer: COMMERCIAL

## 2020-11-03 VITALS
OXYGEN SATURATION: 98 % | BODY MASS INDEX: 31.52 KG/M2 | WEIGHT: 208 LBS | HEIGHT: 68 IN | SYSTOLIC BLOOD PRESSURE: 130 MMHG | TEMPERATURE: 97.2 F | DIASTOLIC BLOOD PRESSURE: 88 MMHG | HEART RATE: 61 BPM

## 2020-11-03 DIAGNOSIS — I10 ESSENTIAL HYPERTENSION: ICD-10-CM

## 2020-11-03 DIAGNOSIS — E66.9 OBESITY (BMI 30-39.9): ICD-10-CM

## 2020-11-03 DIAGNOSIS — R73.01 IMPAIRED FASTING GLUCOSE: ICD-10-CM

## 2020-11-03 DIAGNOSIS — Z12.31 ENCOUNTER FOR SCREENING MAMMOGRAM FOR BREAST CANCER: ICD-10-CM

## 2020-11-03 DIAGNOSIS — E78.2 MIXED HYPERLIPIDEMIA: ICD-10-CM

## 2020-11-03 DIAGNOSIS — R73.01 IFG (IMPAIRED FASTING GLUCOSE): Primary | ICD-10-CM

## 2020-11-03 DIAGNOSIS — E55.9 VITAMIN D DEFICIENCY: ICD-10-CM

## 2020-11-03 PROCEDURE — 99214 OFFICE O/P EST MOD 30 MIN: CPT | Performed by: INTERNAL MEDICINE

## 2020-11-03 SDOH — HEALTH STABILITY: MENTAL HEALTH: HOW OFTEN DO YOU HAVE 6 OR MORE DRINKS ON ONE OCCASION?: NEVER

## 2020-11-03 SDOH — HEALTH STABILITY: MENTAL HEALTH: HOW OFTEN DO YOU HAVE A DRINK CONTAINING ALCOHOL?: 2-3 TIMES A WEEK

## 2020-11-03 SDOH — HEALTH STABILITY: MENTAL HEALTH: HOW MANY STANDARD DRINKS CONTAINING ALCOHOL DO YOU HAVE ON A TYPICAL DAY?: 1 OR 2

## 2020-11-03 ASSESSMENT — ENCOUNTER SYMPTOMS
NEUROLOGICAL NEGATIVE: 1
MUSCULOSKELETAL NEGATIVE: 1
PSYCHIATRIC NEGATIVE: 1
GASTROINTESTINAL NEGATIVE: 1
EYES NEGATIVE: 1
CARDIOVASCULAR NEGATIVE: 1
RESPIRATORY NEGATIVE: 1
CONSTITUTIONAL NEGATIVE: 1

## 2020-11-03 ASSESSMENT — FIBROSIS 4 INDEX: FIB4 SCORE: 0.79

## 2020-11-04 NOTE — PROGRESS NOTES
Subjective:      Desirae Paris is a 60 y.o. female who presents with Follow-Up (Hyperlipidemia; Hypertension) and Lab Results  The patient is here for followup of chronic medical problems listed below. The patient is compliant with medications and having no side effects from them. Denies chest pain, abdominal pain, dyspnea, myalgias, or cough.   Patient Active Problem List    Diagnosis Date Noted   • Obesity (BMI 30-39.9) 11/03/2020   • Knee clicking- right 07/12/2019   • Primary osteoarthritis of both knees 05/18/2018   • Obesity (BMI 30.0-34.9) 05/18/2017   • Potassium (K) deficiency 07/06/2016   • Essential hypertension 01/18/2012   • IFG (impaired fasting glucose) 01/18/2012   • Mixed hyperlipidemia 01/18/2012     No Known Allergies  Outpatient Medications Prior to Visit   Medication Sig Dispense Refill   • lisinopril (PRINIVIL) 20 MG Tab Take 1 Tab by mouth every day. 90 Tab 4   • fenofibrate (TRIGLIDE) 160 MG tablet TAKE 1 TABLET BY MOUTH EVERY DAY 90 Tab 4   • potassium chloride (MICRO-K) 10 MEQ capsule Take 1 Cap by mouth 2 times a day. 180 Cap 4   • hydroCHLOROthiazide (HYDRODIURIL) 25 MG Tab Take 1 Tab by mouth every day. 90 Tab 4   • metoprolol (LOPRESSOR) 50 MG Tab Take 1 Tab by mouth 2 times a day. 180 Tab 4   • glucosamine Sulfate 500 MG Cap Take 1 Cap by mouth 3 times a day, with meals. 270 Cap 4   • Omega-3 Fatty Acids (FISH OIL) 1000 MG CAPS capsule Take 1,000 mg by mouth 3 times a day, with meals.     • vitamin D (CHOLECALCIFEROL) 1000 UNIT TABS Take 2 Tabs by mouth every day. 30 Tab 11     No facility-administered medications prior to visit.                HPI    Review of Systems   Constitutional: Negative.    HENT: Negative.    Eyes: Negative.    Respiratory: Negative.    Cardiovascular: Negative.    Gastrointestinal: Negative.    Genitourinary: Negative.    Musculoskeletal: Negative.    Skin: Negative.    Neurological: Negative.    Endo/Heme/Allergies: Negative.    Psychiatric/Behavioral:  "Negative.           Objective:     /88 (BP Location: Left arm, Patient Position: Sitting, BP Cuff Size: Adult)   Pulse 61   Temp 36.2 °C (97.2 °F) (Temporal)   Ht 1.727 m (5' 8\")   Wt 94.3 kg (208 lb)   SpO2 98%   BMI 31.63 kg/m²      Physical Exam  Vitals signs reviewed.   Constitutional:       General: She is not in acute distress.     Appearance: She is well-developed. She is not diaphoretic.   HENT:      Head: Normocephalic and atraumatic.      Right Ear: External ear normal.      Left Ear: External ear normal.      Nose: Nose normal.      Mouth/Throat:      Pharynx: No oropharyngeal exudate.   Eyes:      General: No scleral icterus.        Right eye: No discharge.         Left eye: No discharge.      Conjunctiva/sclera: Conjunctivae normal.      Pupils: Pupils are equal, round, and reactive to light.   Neck:      Musculoskeletal: Normal range of motion and neck supple.      Thyroid: No thyromegaly.      Vascular: No JVD.      Trachea: No tracheal deviation.   Cardiovascular:      Rate and Rhythm: Normal rate and regular rhythm.      Heart sounds: Normal heart sounds. No murmur. No friction rub. No gallop.    Pulmonary:      Effort: Pulmonary effort is normal. No respiratory distress.      Breath sounds: Normal breath sounds. No stridor. No wheezing or rales.   Chest:      Chest wall: No tenderness.   Abdominal:      General: Bowel sounds are normal. There is no distension.      Palpations: Abdomen is soft. There is no mass.      Tenderness: There is no abdominal tenderness. There is no guarding or rebound.   Musculoskeletal: Normal range of motion.         General: No tenderness.   Lymphadenopathy:      Cervical: No cervical adenopathy.   Skin:     General: Skin is warm and dry.      Coloration: Skin is not pale.      Findings: No erythema or rash.   Neurological:      Mental Status: She is alert and oriented to person, place, and time.      Cranial Nerves: No cranial nerve deficit.      Motor: No " abnormal muscle tone.      Coordination: Coordination normal.      Deep Tendon Reflexes: Reflexes are normal and symmetric. Reflexes normal.   Psychiatric:         Behavior: Behavior normal.         Thought Content: Thought content normal.         Judgment: Judgment normal.       No visits with results within 1 Month(s) from this visit.   Latest known visit with results is:   Orders Only on 07/14/2020   Component Date Value   • WBC 07/14/2020 5.2    • RBC 07/14/2020 4.78    • Hemoglobin 07/14/2020 14.3    • Hematocrit 07/14/2020 42.6    • MCV 07/14/2020 89    • MCH 07/14/2020 29.9    • MCHC 07/14/2020 33.6    • RDW 07/14/2020 12.6    • Platelet Count 07/14/2020 328    • Neutrophils-Polys 07/14/2020 52    • Lymphocytes 07/14/2020 36    • Monocytes 07/14/2020 9    • Eosinophils 07/14/2020 2    • Basophils 07/14/2020 1    • Immature Cells 07/14/2020 CANCELED    • Neutrophils (Absolute) 07/14/2020 2.7    • Lymphs (Absolute) 07/14/2020 1.9    • Monos (Absolute) 07/14/2020 0.5    • Eos (Absolute) 07/14/2020 0.1    • Baso (Absolute) 07/14/2020 0.0    • Immature Granulocytes 07/14/2020 0    • Immature Granulocytes (a* 07/14/2020 0.0    • Nucleated RBC 07/14/2020 CANCELED    • Comments-Diff 07/14/2020 CANCELED    • Glucose 07/14/2020 110*   • Bun 07/14/2020 19    • Creatinine 07/14/2020 0.94    • GFR If Non  Ameri* 07/14/2020 67    • GFR If  07/14/2020 77    • Bun-Creatinine Ratio 07/14/2020 20    • Sodium 07/14/2020 142    • Potassium 07/14/2020 4.4    • Chloride 07/14/2020 102    • Co2 07/14/2020 23    • Calcium 07/14/2020 10.1    • Total Protein 07/14/2020 6.5    • Albumin 07/14/2020 4.4    • Globulin 07/14/2020 2.1    • A-G Ratio 07/14/2020 2.1    • Total Bilirubin 07/14/2020 0.4    • Alkaline Phosphatase 07/14/2020 58    • AST(SGOT) 07/14/2020 19    • ALT(SGPT) 07/14/2020 18    • Cholesterol,Tot 07/14/2020 163    • Triglycerides 07/14/2020 162*   • HDL 07/14/2020 52    • VLDL Cholesterol Calc  07/14/2020 32    • LDL 07/14/2020 79    • Comment: 07/14/2020 CANCELED    • Glycohemoglobin 07/14/2020 5.7*      Lab Results   Component Value Date/Time    HBA1C 5.7 (H) 07/14/2020 07:10 AM    HBA1C 5.8 (H) 01/10/2020 06:20 AM     Lab Results   Component Value Date/Time    SODIUM 142 07/14/2020 07:10 AM    SODIUM 136 02/01/2013 09:56 AM    POTASSIUM 4.4 07/14/2020 07:10 AM    POTASSIUM 3.9 02/01/2013 09:56 AM    CHLORIDE 102 07/14/2020 07:10 AM    CHLORIDE 99 02/01/2013 09:56 AM    CO2 23 07/14/2020 07:10 AM    CO2 23 02/01/2013 09:56 AM    GLUCOSE 110 (H) 07/14/2020 07:10 AM    GLUCOSE 91 02/01/2013 09:56 AM    BUN 19 07/14/2020 07:10 AM    BUN 14 02/01/2013 09:56 AM    CREATININE 0.94 07/14/2020 07:10 AM    CREATININE 1.04 (H) 02/01/2013 09:56 AM    BUNCREATRAT 20 07/14/2020 07:10 AM    BUNCREATRAT 13 02/01/2013 09:56 AM    ALKPHOSPHAT 58 07/14/2020 07:10 AM    ALKPHOSPHAT 51 02/01/2013 09:56 AM    ASTSGOT 19 07/14/2020 07:10 AM    ASTSGOT 17 02/01/2013 09:56 AM    ALTSGPT 18 07/14/2020 07:10 AM    ALTSGPT 14 02/01/2013 09:56 AM    TBILIRUBIN 0.4 07/14/2020 07:10 AM    TBILIRUBIN 0.9 02/01/2013 09:56 AM     No results found for: INR  Lab Results   Component Value Date/Time    CHOLSTRLTOT 163 07/14/2020 07:10 AM    CHOLSTRLTOT 155 02/01/2013 09:56 AM    LDL 79 07/14/2020 07:10 AM    LDL 77 02/01/2013 09:56 AM    HDL 52 07/14/2020 07:10 AM    HDL 51 02/01/2013 09:56 AM    TRIGLYCERIDE 162 (H) 07/14/2020 07:10 AM    TRIGLYCERIDE 136 02/01/2013 09:56 AM       No results found for: TESTOSTERONE  Lab Results   Component Value Date/Time    TSH 1.920 06/27/2019 09:00 AM    TSH 2.820 05/08/2017 08:29 AM     No results found for: FREET4  No results found for: URICACID  No components found for: VITB12  Lab Results   Component Value Date/Time    25HYDROXY 42.4 04/18/2014 08:39 AM    25HYDROXY 38.7 02/24/2012 10:19 AM                   Assessment/Plan:        1. Encounter for screening mammogram for breast cancer     -  MA-SCREENING MAMMO BILAT W/CAD; Future    2. Obesity (BMI 30-39.9)    diet/exercise/lose 15 lbs.; patient counseled    - Patient identified as having weight management issue.  Appropriate orders and counseling given.    3. Mixed hyperlipidemia  Under good control. Continue same regimen.     - TSH; Future  - Comp Metabolic Panel; Future  - Lipid Profile; Future  - CBC WITH DIFFERENTIAL; Future    4. IFG (impaired fasting glucose)  .ddie     - HEMOGLOBIN A1C; Future    5. Essential hypertension  Under good control. Continue same regimen.               7. Vitamin D deficiency      - VITAMIN D,25 HYDROXY; Future

## 2020-12-02 DIAGNOSIS — I10 ESSENTIAL HYPERTENSION: ICD-10-CM

## 2020-12-02 RX ORDER — HYDROCHLOROTHIAZIDE 25 MG/1
TABLET ORAL
Qty: 90 TAB | Refills: 0 | Status: SHIPPED | OUTPATIENT
Start: 2020-12-02 | End: 2021-02-10

## 2020-12-02 RX ORDER — METOPROLOL TARTRATE 50 MG/1
TABLET, FILM COATED ORAL
Qty: 180 TAB | Refills: 0 | Status: SHIPPED | OUTPATIENT
Start: 2020-12-02 | End: 2021-02-10

## 2021-01-19 ENCOUNTER — HOSPITAL ENCOUNTER (OUTPATIENT)
Dept: RADIOLOGY | Facility: MEDICAL CENTER | Age: 61
End: 2021-01-19
Attending: INTERNAL MEDICINE
Payer: COMMERCIAL

## 2021-01-19 DIAGNOSIS — Z12.31 VISIT FOR SCREENING MAMMOGRAM: ICD-10-CM

## 2021-01-19 PROCEDURE — 77063 BREAST TOMOSYNTHESIS BI: CPT

## 2021-02-10 DIAGNOSIS — I10 ESSENTIAL HYPERTENSION: ICD-10-CM

## 2021-02-10 RX ORDER — METOPROLOL TARTRATE 50 MG/1
TABLET, FILM COATED ORAL
Qty: 120 TABLET | Refills: 2 | Status: SHIPPED | OUTPATIENT
Start: 2021-02-10 | End: 2021-08-05

## 2021-02-10 RX ORDER — LISINOPRIL 20 MG/1
TABLET ORAL
Refills: 17 | OUTPATIENT
Start: 2021-02-10

## 2021-02-10 RX ORDER — HYDROCHLOROTHIAZIDE 25 MG/1
TABLET ORAL
Qty: 90 TABLET | Refills: 2 | Status: SHIPPED | OUTPATIENT
Start: 2021-02-10 | End: 2021-11-02

## 2021-03-02 ENCOUNTER — PATIENT MESSAGE (OUTPATIENT)
Dept: MEDICAL GROUP | Age: 61
End: 2021-03-02

## 2021-03-02 DIAGNOSIS — E87.6 POTASSIUM (K) DEFICIENCY: ICD-10-CM

## 2021-03-02 DIAGNOSIS — I10 ESSENTIAL HYPERTENSION: ICD-10-CM

## 2021-03-02 DIAGNOSIS — E78.5 HYPERLIPIDEMIA, UNSPECIFIED HYPERLIPIDEMIA TYPE: ICD-10-CM

## 2021-03-02 RX ORDER — FENOFIBRATE 160 MG/1
160 TABLET ORAL
Qty: 90 TABLET | Refills: 4 | Status: SHIPPED | OUTPATIENT
Start: 2021-03-02 | End: 2021-04-06 | Stop reason: SDUPTHER

## 2021-03-02 RX ORDER — LISINOPRIL 20 MG/1
20 TABLET ORAL DAILY
Qty: 90 TABLET | Refills: 4 | Status: SHIPPED | OUTPATIENT
Start: 2021-03-02 | End: 2022-01-30

## 2021-03-02 RX ORDER — POTASSIUM CHLORIDE 750 MG/1
10 CAPSULE, EXTENDED RELEASE ORAL 2 TIMES DAILY
Qty: 180 CAPSULE | Refills: 4 | Status: SHIPPED | OUTPATIENT
Start: 2021-03-02 | End: 2021-11-02

## 2021-03-03 RX ORDER — LISINOPRIL 20 MG/1
20 TABLET ORAL DAILY
Qty: 90 TABLET | Refills: 4 | Status: CANCELLED | OUTPATIENT
Start: 2021-03-03

## 2021-03-03 NOTE — PATIENT COMMUNICATION
Received request via: Patient    Was the patient seen in the last year in this department? Yes    Does the patient have an active prescription (recently filled or refills available) for medication(s) requested? Yes, Patient states that [facility] has been sending her prescriptions in 30 day supplies and they no longer have any refills available. Phamracy needs new prescription.

## 2021-03-15 DIAGNOSIS — Z23 NEED FOR VACCINATION: ICD-10-CM

## 2021-03-23 ENCOUNTER — IMMUNIZATION (OUTPATIENT)
Dept: FAMILY PLANNING/WOMEN'S HEALTH CLINIC | Facility: IMMUNIZATION CENTER | Age: 61
End: 2021-03-23
Attending: INTERNAL MEDICINE
Payer: COMMERCIAL

## 2021-03-23 DIAGNOSIS — Z23 NEED FOR VACCINATION: ICD-10-CM

## 2021-03-23 DIAGNOSIS — Z23 ENCOUNTER FOR VACCINATION: Primary | ICD-10-CM

## 2021-03-23 PROCEDURE — 0001A PFIZER SARS-COV-2 VACCINE: CPT | Performed by: INTERNAL MEDICINE

## 2021-03-23 PROCEDURE — 91300 PFIZER SARS-COV-2 VACCINE: CPT | Performed by: INTERNAL MEDICINE

## 2021-04-06 ENCOUNTER — PATIENT MESSAGE (OUTPATIENT)
Dept: MEDICAL GROUP | Age: 61
End: 2021-04-06

## 2021-04-06 DIAGNOSIS — E78.5 HYPERLIPIDEMIA, UNSPECIFIED HYPERLIPIDEMIA TYPE: ICD-10-CM

## 2021-04-06 RX ORDER — FENOFIBRATE 160 MG/1
160 TABLET ORAL
Qty: 90 TABLET | Refills: 4 | Status: SHIPPED | OUTPATIENT
Start: 2021-04-06 | End: 2022-02-28

## 2021-04-06 NOTE — PATIENT COMMUNICATION
Received request via: Patient    Was the patient seen in the last year in this department? Yes    Does the patient have an active prescription (recently filled or refills available) for medication(s) requested? No     Pt needs this sent into mail order service, correct pharmacy, please sign

## 2021-04-16 ENCOUNTER — IMMUNIZATION (OUTPATIENT)
Dept: FAMILY PLANNING/WOMEN'S HEALTH CLINIC | Facility: IMMUNIZATION CENTER | Age: 61
End: 2021-04-16
Attending: INTERNAL MEDICINE
Payer: COMMERCIAL

## 2021-04-16 DIAGNOSIS — Z23 ENCOUNTER FOR VACCINATION: Primary | ICD-10-CM

## 2021-04-16 PROCEDURE — 91300 PFIZER SARS-COV-2 VACCINE: CPT

## 2021-04-16 PROCEDURE — 0002A PFIZER SARS-COV-2 VACCINE: CPT

## 2021-05-01 LAB
25(OH)D3+25(OH)D2 SERPL-MCNC: 79.1 NG/ML (ref 30–100)
ALBUMIN SERPL-MCNC: 4.7 G/DL (ref 3.8–4.9)
ALBUMIN/GLOB SERPL: 2 {RATIO} (ref 1.2–2.2)
ALP SERPL-CCNC: 67 IU/L (ref 39–117)
ALT SERPL-CCNC: 21 IU/L (ref 0–32)
AST SERPL-CCNC: 20 IU/L (ref 0–40)
BASOPHILS # BLD AUTO: 0 X10E3/UL (ref 0–0.2)
BASOPHILS NFR BLD AUTO: 1 %
BILIRUB SERPL-MCNC: 0.5 MG/DL (ref 0–1.2)
BUN SERPL-MCNC: 15 MG/DL (ref 6–24)
BUN/CREAT SERPL: 18 (ref 9–23)
CALCIUM SERPL-MCNC: 10.3 MG/DL (ref 8.7–10.2)
CHLORIDE SERPL-SCNC: 102 MMOL/L (ref 96–106)
CHOLEST SERPL-MCNC: 170 MG/DL (ref 100–199)
CO2 SERPL-SCNC: 22 MMOL/L (ref 20–29)
CREAT SERPL-MCNC: 0.84 MG/DL (ref 0.57–1)
EOSINOPHIL # BLD AUTO: 0.1 X10E3/UL (ref 0–0.4)
EOSINOPHIL NFR BLD AUTO: 1 %
ERYTHROCYTE [DISTWIDTH] IN BLOOD BY AUTOMATED COUNT: 12.8 % (ref 11.7–15.4)
GLOBULIN SER CALC-MCNC: 2.4 G/DL (ref 1.5–4.5)
GLUCOSE SERPL-MCNC: 93 MG/DL (ref 65–99)
HBA1C MFR BLD: 6 % (ref 4.8–5.6)
HCT VFR BLD AUTO: 46.8 % (ref 34–46.6)
HDLC SERPL-MCNC: 58 MG/DL
HGB BLD-MCNC: 15.6 G/DL (ref 11.1–15.9)
IMM GRANULOCYTES # BLD AUTO: 0 X10E3/UL (ref 0–0.1)
IMM GRANULOCYTES NFR BLD AUTO: 0 %
IMMATURE CELLS  115398: ABNORMAL
LABORATORY COMMENT REPORT: NORMAL
LDLC SERPL CALC-MCNC: 89 MG/DL (ref 0–99)
LYMPHOCYTES # BLD AUTO: 2.3 X10E3/UL (ref 0.7–3.1)
LYMPHOCYTES NFR BLD AUTO: 40 %
MCH RBC QN AUTO: 30.1 PG (ref 26.6–33)
MCHC RBC AUTO-ENTMCNC: 33.3 G/DL (ref 31.5–35.7)
MCV RBC AUTO: 90 FL (ref 79–97)
MONOCYTES # BLD AUTO: 0.5 X10E3/UL (ref 0.1–0.9)
MONOCYTES NFR BLD AUTO: 9 %
MORPHOLOGY BLD-IMP: ABNORMAL
NEUTROPHILS # BLD AUTO: 2.8 X10E3/UL (ref 1.4–7)
NEUTROPHILS NFR BLD AUTO: 49 %
NRBC BLD AUTO-RTO: ABNORMAL %
PLATELET # BLD AUTO: 375 X10E3/UL (ref 150–450)
POTASSIUM SERPL-SCNC: 3.9 MMOL/L (ref 3.5–5.2)
PROT SERPL-MCNC: 7.1 G/DL (ref 6–8.5)
RBC # BLD AUTO: 5.19 X10E6/UL (ref 3.77–5.28)
SODIUM SERPL-SCNC: 142 MMOL/L (ref 134–144)
TRIGL SERPL-MCNC: 131 MG/DL (ref 0–149)
TSH SERPL DL<=0.005 MIU/L-ACNC: 2.84 UIU/ML (ref 0.45–4.5)
VLDLC SERPL CALC-MCNC: 23 MG/DL (ref 5–40)
WBC # BLD AUTO: 5.7 X10E3/UL (ref 3.4–10.8)

## 2021-05-04 ENCOUNTER — OFFICE VISIT (OUTPATIENT)
Dept: MEDICAL GROUP | Age: 61
End: 2021-05-04
Payer: COMMERCIAL

## 2021-05-04 VITALS
HEART RATE: 67 BPM | DIASTOLIC BLOOD PRESSURE: 80 MMHG | SYSTOLIC BLOOD PRESSURE: 136 MMHG | HEIGHT: 67 IN | TEMPERATURE: 98 F | WEIGHT: 210.4 LBS | BODY MASS INDEX: 33.02 KG/M2 | OXYGEN SATURATION: 98 %

## 2021-05-04 DIAGNOSIS — E66.9 OBESITY (BMI 30.0-34.9): ICD-10-CM

## 2021-05-04 DIAGNOSIS — E55.9 VITAMIN D DEFICIENCY: ICD-10-CM

## 2021-05-04 DIAGNOSIS — R73.01 IMPAIRED FASTING GLUCOSE: ICD-10-CM

## 2021-05-04 DIAGNOSIS — R73.01 IFG (IMPAIRED FASTING GLUCOSE): ICD-10-CM

## 2021-05-04 DIAGNOSIS — E78.5 HYPERLIPIDEMIA, UNSPECIFIED HYPERLIPIDEMIA TYPE: ICD-10-CM

## 2021-05-04 DIAGNOSIS — I10 ESSENTIAL HYPERTENSION: ICD-10-CM

## 2021-05-04 PROCEDURE — 99214 OFFICE O/P EST MOD 30 MIN: CPT | Performed by: INTERNAL MEDICINE

## 2021-05-04 ASSESSMENT — ENCOUNTER SYMPTOMS
CONSTITUTIONAL NEGATIVE: 1
EYES NEGATIVE: 1
NEUROLOGICAL NEGATIVE: 1
MUSCULOSKELETAL NEGATIVE: 1
PSYCHIATRIC NEGATIVE: 1
RESPIRATORY NEGATIVE: 1
CARDIOVASCULAR NEGATIVE: 1
GASTROINTESTINAL NEGATIVE: 1

## 2021-05-04 ASSESSMENT — FIBROSIS 4 INDEX: FIB4 SCORE: 0.68

## 2021-05-04 ASSESSMENT — PATIENT HEALTH QUESTIONNAIRE - PHQ9: CLINICAL INTERPRETATION OF PHQ2 SCORE: 0

## 2021-05-04 NOTE — PROGRESS NOTES
Subjective:      Desirae Paris is a 60 y.o. female who presents with Follow-Up (6 month) and Lab Follow-up (6 month )  The patient is here for followup of chronic medical problems listed below. The patient is compliant with medications and having no side effects from them. Denies chest pain, abdominal pain, dyspnea, myalgias, or cough.   Patient Active Problem List    Diagnosis Date Noted   • Obesity (BMI 30-39.9) 11/03/2020   • Knee clicking- right 07/12/2019   • Primary osteoarthritis of both knees 05/18/2018   • Obesity (BMI 30.0-34.9) 05/18/2017   • Potassium (K) deficiency 07/06/2016   • Essential hypertension 01/18/2012   • IFG (impaired fasting glucose) 01/18/2012   • Mixed hyperlipidemia 01/18/2012     Patient has no known allergies.  Outpatient Medications Prior to Visit   Medication Sig Dispense Refill   • fenofibrate (TRIGLIDE) 160 MG tablet Take 1 tablet by mouth every day. 90 tablet 4   • potassium chloride (MICRO-K) 10 MEQ capsule Take 1 capsule by mouth 2 times a day. 180 capsule 4   • lisinopril (PRINIVIL) 20 MG Tab Take 1 tablet by mouth every day. 90 tablet 4   • metoprolol tartrate (LOPRESSOR) 50 MG Tab TAKE 1 TABLET BY MOUTH TWICE A  tablet 2   • hydroCHLOROthiazide (HYDRODIURIL) 25 MG Tab TAKE 1 TABLET BY MOUTH EVERY DAY 90 tablet 2   • glucosamine Sulfate 500 MG Cap Take 1 Cap by mouth 3 times a day, with meals. 270 Cap 4   • Omega-3 Fatty Acids (FISH OIL) 1000 MG CAPS capsule Take 1,000 mg by mouth 3 times a day, with meals.     • vitamin D (CHOLECALCIFEROL) 1000 UNIT TABS Take 2 Tabs by mouth every day. 30 Tab 11     No facility-administered medications prior to visit.     No visits with results within 1 Month(s) from this visit.   Latest known visit with results is:   Orders Only on 07/14/2020   Component Date Value   • WBC 07/14/2020 5.2    • RBC 07/14/2020 4.78    • Hemoglobin 07/14/2020 14.3    • Hematocrit 07/14/2020 42.6    • MCV 07/14/2020 89    • MCH 07/14/2020 29.9    • MCHC  07/14/2020 33.6    • RDW 07/14/2020 12.6    • Platelet Count 07/14/2020 328    • Neutrophils-Polys 07/14/2020 52    • Lymphocytes 07/14/2020 36    • Monocytes 07/14/2020 9    • Eosinophils 07/14/2020 2    • Basophils 07/14/2020 1    • Immature Cells 07/14/2020 CANCELED    • Neutrophils (Absolute) 07/14/2020 2.7    • Lymphs (Absolute) 07/14/2020 1.9    • Monos (Absolute) 07/14/2020 0.5    • Eos (Absolute) 07/14/2020 0.1    • Baso (Absolute) 07/14/2020 0.0    • Immature Granulocytes 07/14/2020 0    • Immature Granulocytes (a* 07/14/2020 0.0    • Nucleated RBC 07/14/2020 CANCELED    • Comments-Diff 07/14/2020 CANCELED    • Glucose 07/14/2020 110*   • Bun 07/14/2020 19    • Creatinine 07/14/2020 0.94    • GFR If Non  Ameri* 07/14/2020 67    • GFR If  07/14/2020 77    • Bun-Creatinine Ratio 07/14/2020 20    • Sodium 07/14/2020 142    • Potassium 07/14/2020 4.4    • Chloride 07/14/2020 102    • Co2 07/14/2020 23    • Calcium 07/14/2020 10.1    • Total Protein 07/14/2020 6.5    • Albumin 07/14/2020 4.4    • Globulin 07/14/2020 2.1    • A-G Ratio 07/14/2020 2.1    • Total Bilirubin 07/14/2020 0.4    • Alkaline Phosphatase 07/14/2020 58    • AST(SGOT) 07/14/2020 19    • ALT(SGPT) 07/14/2020 18    • Cholesterol,Tot 07/14/2020 163    • Triglycerides 07/14/2020 162*   • HDL 07/14/2020 52    • VLDL Cholesterol Calc 07/14/2020 32    • LDL 07/14/2020 79    • Comment: 07/14/2020 CANCELED    • Glycohemoglobin 07/14/2020 5.7*   • WBC 04/30/2021 5.7    • RBC 04/30/2021 5.19    • Hemoglobin 04/30/2021 15.6    • Hematocrit 04/30/2021 46.8*   • MCV 04/30/2021 90    • MCH 04/30/2021 30.1    • MCHC 04/30/2021 33.3    • RDW 04/30/2021 12.8    • Platelet Count 04/30/2021 375    • Neutrophils-Polys 04/30/2021 49    • Lymphocytes 04/30/2021 40    • Monocytes 04/30/2021 9    • Eosinophils 04/30/2021 1    • Basophils 04/30/2021 1    • Immature Cells 04/30/2021 CANCELED    • Neutrophils (Absolute) 04/30/2021 2.8    • Lymphs  (Absolute) 04/30/2021 2.3    • Monos (Absolute) 04/30/2021 0.5    • Eos (Absolute) 04/30/2021 0.1    • Baso (Absolute) 04/30/2021 0.0    • Immature Granulocytes 04/30/2021 0    • Immature Granulocytes (a* 04/30/2021 0.0    • Nucleated RBC 04/30/2021 CANCELED    • Comments-Diff 04/30/2021 CANCELED    • Glucose 04/30/2021 93    • Bun 04/30/2021 15    • Creatinine 04/30/2021 0.84    • GFR If Non  Ameri* 04/30/2021 77    • GFR If  04/30/2021 89    • Bun-Creatinine Ratio 04/30/2021 18    • Sodium 04/30/2021 142    • Potassium 04/30/2021 3.9    • Chloride 04/30/2021 102    • Co2 04/30/2021 22    • Calcium 04/30/2021 10.3*   • Total Protein 04/30/2021 7.1    • Albumin 04/30/2021 4.7    • Globulin 04/30/2021 2.4    • A-G Ratio 04/30/2021 2.0    • Total Bilirubin 04/30/2021 0.5    • Alkaline Phosphatase 04/30/2021 67    • AST(SGOT) 04/30/2021 20    • ALT(SGPT) 04/30/2021 21    • Cholesterol,Tot 04/30/2021 170    • Triglycerides 04/30/2021 131    • HDL 04/30/2021 58    • VLDL Cholesterol Calc 04/30/2021 23    • LDL Chol Calc (NIH) 04/30/2021 89    • Comment: 04/30/2021 CANCELED    • Glycohemoglobin 04/30/2021 6.0*   • TSH 04/30/2021 2.840    • 25-Hydroxy   Vitamin D 25 04/30/2021 79.1       Lab Results   Component Value Date/Time    HBA1C 6.0 (H) 04/30/2021 11:13 AM    HBA1C 5.7 (H) 07/14/2020 07:10 AM     Lab Results   Component Value Date/Time    SODIUM 142 04/30/2021 11:13 AM    SODIUM 136 02/01/2013 09:56 AM    POTASSIUM 3.9 04/30/2021 11:13 AM    POTASSIUM 3.9 02/01/2013 09:56 AM    CHLORIDE 102 04/30/2021 11:13 AM    CHLORIDE 99 02/01/2013 09:56 AM    CO2 22 04/30/2021 11:13 AM    CO2 23 02/01/2013 09:56 AM    GLUCOSE 93 04/30/2021 11:13 AM    GLUCOSE 91 02/01/2013 09:56 AM    BUN 15 04/30/2021 11:13 AM    BUN 14 02/01/2013 09:56 AM    CREATININE 0.84 04/30/2021 11:13 AM    CREATININE 1.04 (H) 02/01/2013 09:56 AM    BUNCREATRAT 18 04/30/2021 11:13 AM    BUNCREATRAT 13 02/01/2013 09:56 AM     "ALKPHOSPHAT 67 04/30/2021 11:13 AM    ALKPHOSPHAT 51 02/01/2013 09:56 AM    ASTSGOT 20 04/30/2021 11:13 AM    ASTSGOT 17 02/01/2013 09:56 AM    ALTSGPT 21 04/30/2021 11:13 AM    ALTSGPT 14 02/01/2013 09:56 AM    TBILIRUBIN 0.5 04/30/2021 11:13 AM    TBILIRUBIN 0.9 02/01/2013 09:56 AM     No results found for: INR  Lab Results   Component Value Date/Time    CHOLSTRLTOT 170 04/30/2021 11:13 AM    CHOLSTRLTOT 155 02/01/2013 09:56 AM    LDL 79 07/14/2020 07:10 AM    LDL 77 02/01/2013 09:56 AM    HDL 58 04/30/2021 11:13 AM    HDL 51 02/01/2013 09:56 AM    TRIGLYCERIDE 131 04/30/2021 11:13 AM    TRIGLYCERIDE 136 02/01/2013 09:56 AM       No results found for: TESTOSTERONE  Lab Results   Component Value Date/Time    TSH 2.840 04/30/2021 11:13 AM    TSH 1.920 06/27/2019 09:00 AM     No results found for: FREET4  No results found for: URICACID  No components found for: VITB12  Lab Results   Component Value Date/Time    25HYDROXY 79.1 04/30/2021 11:13 AM    25HYDROXY 42.4 04/18/2014 08:39 AM               HPI    Review of Systems   Constitutional: Negative.    HENT: Negative.    Eyes: Negative.    Respiratory: Negative.    Cardiovascular: Negative.    Gastrointestinal: Negative.    Genitourinary: Negative.    Musculoskeletal: Negative.    Skin: Negative.    Neurological: Negative.    Endo/Heme/Allergies: Negative.    Psychiatric/Behavioral: Negative.           Objective:     /80 (BP Location: Right arm, Patient Position: Sitting, BP Cuff Size: Adult)   Pulse 67   Temp 36.7 °C (98 °F) (Temporal)   Ht 1.702 m (5' 7\")   Wt 95.4 kg (210 lb 6.4 oz)   SpO2 98%   BMI 32.95 kg/m²      Physical Exam  Vitals reviewed.   Constitutional:       General: She is not in acute distress.     Appearance: She is well-developed. She is not diaphoretic.   HENT:      Head: Normocephalic and atraumatic.      Right Ear: External ear normal.      Left Ear: External ear normal.      Nose: Nose normal.      Mouth/Throat:      Pharynx: No " oropharyngeal exudate.   Eyes:      General: No scleral icterus.        Right eye: No discharge.         Left eye: No discharge.      Conjunctiva/sclera: Conjunctivae normal.      Pupils: Pupils are equal, round, and reactive to light.   Neck:      Thyroid: No thyromegaly.      Vascular: No JVD.      Trachea: No tracheal deviation.   Cardiovascular:      Rate and Rhythm: Normal rate and regular rhythm.      Heart sounds: Normal heart sounds. No murmur. No friction rub. No gallop.    Pulmonary:      Effort: Pulmonary effort is normal. No respiratory distress.      Breath sounds: Normal breath sounds. No stridor. No wheezing or rales.   Chest:      Chest wall: No tenderness.   Abdominal:      General: Bowel sounds are normal. There is no distension.      Palpations: Abdomen is soft. There is no mass.      Tenderness: There is no abdominal tenderness. There is no guarding or rebound.   Musculoskeletal:         General: No tenderness. Normal range of motion.      Cervical back: Normal range of motion and neck supple.   Lymphadenopathy:      Cervical: No cervical adenopathy.   Skin:     General: Skin is warm and dry.      Coloration: Skin is not pale.      Findings: No erythema or rash.   Neurological:      Mental Status: She is alert and oriented to person, place, and time.      Cranial Nerves: No cranial nerve deficit.      Motor: No abnormal muscle tone.      Coordination: Coordination normal.      Deep Tendon Reflexes: Reflexes are normal and symmetric. Reflexes normal.   Psychiatric:         Behavior: Behavior normal.         Thought Content: Thought content normal.         Judgment: Judgment normal.                  Assessment/Plan:        1. Hyperlipidemia, unspecified hyperlipidemia type     diet/exercise/lose 15 lbs.; patient counseled  - TSH; Future  - Comp Metabolic Panel; Future  - Lipid Profile; Future  - CBC WITH DIFFERENTIAL; Future    2. Essential hypertension     diet/exercise/lose 15 lbs.; patient  counseled    3. Impaired fasting glucose     diet/exercise/lose 15 lbs.; patient counseled  - HEMOGLOBIN A1C; Future    4. Vitamin D deficiency       diet/exercise/lose 15 lbs.; patient counseled- VITAMIN D,25 HYDROXY; Future    5. IFG (impaired fasting glucose)     diet/exercise/lose 15 lbs.; patient counseled    6. Obesity (BMI 30.0-34.9)    diet/exercise/lose 15 lbs.; patient counseled

## 2021-08-04 DIAGNOSIS — I10 ESSENTIAL HYPERTENSION: ICD-10-CM

## 2021-08-05 RX ORDER — METOPROLOL TARTRATE 50 MG/1
TABLET, FILM COATED ORAL
Qty: 180 TABLET | Refills: 3 | Status: SHIPPED | OUTPATIENT
Start: 2021-08-05 | End: 2022-08-16

## 2021-10-26 LAB
25(OH)D3+25(OH)D2 SERPL-MCNC: 79.5 NG/ML (ref 30–100)
ALBUMIN SERPL-MCNC: 4.7 G/DL (ref 3.8–4.9)
ALBUMIN/GLOB SERPL: 1.9 {RATIO} (ref 1.2–2.2)
ALP SERPL-CCNC: 65 IU/L (ref 44–121)
ALT SERPL-CCNC: 20 IU/L (ref 0–32)
AST SERPL-CCNC: 19 IU/L (ref 0–40)
BASOPHILS # BLD AUTO: 0 X10E3/UL (ref 0–0.2)
BASOPHILS NFR BLD AUTO: 1 %
BILIRUB SERPL-MCNC: 0.3 MG/DL (ref 0–1.2)
BUN SERPL-MCNC: 22 MG/DL (ref 6–24)
BUN/CREAT SERPL: 23 (ref 9–23)
CALCIUM SERPL-MCNC: 10.4 MG/DL (ref 8.7–10.2)
CHLORIDE SERPL-SCNC: 101 MMOL/L (ref 96–106)
CHOLEST SERPL-MCNC: 177 MG/DL (ref 100–199)
CO2 SERPL-SCNC: 24 MMOL/L (ref 20–29)
CREAT SERPL-MCNC: 0.94 MG/DL (ref 0.57–1)
EOSINOPHIL # BLD AUTO: 0.1 X10E3/UL (ref 0–0.4)
EOSINOPHIL NFR BLD AUTO: 3 %
ERYTHROCYTE [DISTWIDTH] IN BLOOD BY AUTOMATED COUNT: 12.6 % (ref 11.7–15.4)
GLOBULIN SER CALC-MCNC: 2.5 G/DL (ref 1.5–4.5)
GLUCOSE SERPL-MCNC: 104 MG/DL (ref 65–99)
HBA1C MFR BLD: 6 % (ref 4.8–5.6)
HCT VFR BLD AUTO: 47.4 % (ref 34–46.6)
HDLC SERPL-MCNC: 56 MG/DL
HGB BLD-MCNC: 15.2 G/DL (ref 11.1–15.9)
IMM GRANULOCYTES # BLD AUTO: 0 X10E3/UL (ref 0–0.1)
IMM GRANULOCYTES NFR BLD AUTO: 0 %
IMMATURE CELLS  115398: ABNORMAL
LABORATORY COMMENT REPORT: NORMAL
LDLC SERPL CALC-MCNC: 96 MG/DL (ref 0–99)
LYMPHOCYTES # BLD AUTO: 1.9 X10E3/UL (ref 0.7–3.1)
LYMPHOCYTES NFR BLD AUTO: 39 %
MCH RBC QN AUTO: 29 PG (ref 26.6–33)
MCHC RBC AUTO-ENTMCNC: 32.1 G/DL (ref 31.5–35.7)
MCV RBC AUTO: 90 FL (ref 79–97)
MONOCYTES # BLD AUTO: 0.4 X10E3/UL (ref 0.1–0.9)
MONOCYTES NFR BLD AUTO: 8 %
MORPHOLOGY BLD-IMP: ABNORMAL
NEUTROPHILS # BLD AUTO: 2.5 X10E3/UL (ref 1.4–7)
NEUTROPHILS NFR BLD AUTO: 49 %
NRBC BLD AUTO-RTO: ABNORMAL %
PLATELET # BLD AUTO: 347 X10E3/UL (ref 150–450)
POTASSIUM SERPL-SCNC: 4.1 MMOL/L (ref 3.5–5.2)
PROT SERPL-MCNC: 7.2 G/DL (ref 6–8.5)
RBC # BLD AUTO: 5.25 X10E6/UL (ref 3.77–5.28)
SODIUM SERPL-SCNC: 141 MMOL/L (ref 134–144)
TRIGL SERPL-MCNC: 143 MG/DL (ref 0–149)
TSH SERPL DL<=0.005 MIU/L-ACNC: 3.42 UIU/ML (ref 0.45–4.5)
VLDLC SERPL CALC-MCNC: 25 MG/DL (ref 5–40)
WBC # BLD AUTO: 4.9 X10E3/UL (ref 3.4–10.8)

## 2021-10-29 DIAGNOSIS — E87.6 POTASSIUM (K) DEFICIENCY: ICD-10-CM

## 2021-10-31 DIAGNOSIS — I10 ESSENTIAL HYPERTENSION: ICD-10-CM

## 2021-10-31 DIAGNOSIS — E87.6 POTASSIUM (K) DEFICIENCY: ICD-10-CM

## 2021-11-02 RX ORDER — POTASSIUM CHLORIDE 750 MG/1
CAPSULE, EXTENDED RELEASE ORAL
Qty: 60 CAPSULE | Refills: 35 | Status: SHIPPED | OUTPATIENT
Start: 2021-11-02 | End: 2022-10-27

## 2021-11-02 RX ORDER — POTASSIUM CHLORIDE 750 MG/1
CAPSULE, EXTENDED RELEASE ORAL
Qty: 60 CAPSULE | Refills: 20 | Status: SHIPPED | OUTPATIENT
Start: 2021-11-02 | End: 2021-11-04

## 2021-11-02 RX ORDER — HYDROCHLOROTHIAZIDE 25 MG/1
TABLET ORAL
Qty: 30 TABLET | Refills: 8 | Status: SHIPPED | OUTPATIENT
Start: 2021-11-02 | End: 2022-08-16

## 2021-11-04 ENCOUNTER — HOSPITAL ENCOUNTER (OUTPATIENT)
Dept: LAB | Facility: MEDICAL CENTER | Age: 61
End: 2021-11-04
Attending: INTERNAL MEDICINE
Payer: COMMERCIAL

## 2021-11-04 ENCOUNTER — OFFICE VISIT (OUTPATIENT)
Dept: MEDICAL GROUP | Age: 61
End: 2021-11-04
Payer: COMMERCIAL

## 2021-11-04 VITALS
WEIGHT: 208 LBS | HEIGHT: 67 IN | SYSTOLIC BLOOD PRESSURE: 134 MMHG | BODY MASS INDEX: 32.65 KG/M2 | DIASTOLIC BLOOD PRESSURE: 68 MMHG | OXYGEN SATURATION: 99 % | HEART RATE: 67 BPM | TEMPERATURE: 98.6 F

## 2021-11-04 DIAGNOSIS — E83.52 HIGH CALCIUM LEVELS: ICD-10-CM

## 2021-11-04 DIAGNOSIS — R73.01 IMPAIRED FASTING GLUCOSE: ICD-10-CM

## 2021-11-04 DIAGNOSIS — R73.01 IFG (IMPAIRED FASTING GLUCOSE): ICD-10-CM

## 2021-11-04 DIAGNOSIS — E66.9 OBESITY (BMI 30.0-34.9): ICD-10-CM

## 2021-11-04 DIAGNOSIS — E55.9 VITAMIN D DEFICIENCY: ICD-10-CM

## 2021-11-04 DIAGNOSIS — E78.5 HYPERLIPIDEMIA, UNSPECIFIED HYPERLIPIDEMIA TYPE: ICD-10-CM

## 2021-11-04 LAB — PTH-INTACT SERPL-MCNC: 27.7 PG/ML (ref 14–72)

## 2021-11-04 PROCEDURE — 83970 ASSAY OF PARATHORMONE: CPT

## 2021-11-04 PROCEDURE — 99214 OFFICE O/P EST MOD 30 MIN: CPT | Performed by: INTERNAL MEDICINE

## 2021-11-04 PROCEDURE — 36415 COLL VENOUS BLD VENIPUNCTURE: CPT

## 2021-11-04 ASSESSMENT — ENCOUNTER SYMPTOMS
RESPIRATORY NEGATIVE: 1
EYES NEGATIVE: 1
MUSCULOSKELETAL NEGATIVE: 1
PSYCHIATRIC NEGATIVE: 1
GASTROINTESTINAL NEGATIVE: 1
NEUROLOGICAL NEGATIVE: 1
CARDIOVASCULAR NEGATIVE: 1
CONSTITUTIONAL NEGATIVE: 1

## 2021-11-04 ASSESSMENT — FIBROSIS 4 INDEX: FIB4 SCORE: 0.72

## 2021-11-04 NOTE — PROGRESS NOTES
Subjective     Desirae Paris is a 61 y.o. female who presents with Follow-Up (6mon F/V) and Lab Results  The patient is here for followup of chronic medical problems listed below. The patient is compliant with medications and having no side effects from them. Denies chest pain, abdominal pain, dyspnea, myalgias, or cough.   Patient Active Problem List    Diagnosis Date Noted   • Obesity (BMI 30-39.9) 11/03/2020   • Knee clicking- right 07/12/2019   • Primary osteoarthritis of both knees 05/18/2018   • Obesity (BMI 30.0-34.9) 05/18/2017   • Potassium (K) deficiency 07/06/2016   • Essential hypertension 01/18/2012   • IFG (impaired fasting glucose) 01/18/2012   • Mixed hyperlipidemia 01/18/2012     Patient has no known allergies.  ,  Outpatient Medications Prior to Visit   Medication Sig Dispense Refill   • potassium chloride (MICRO-K) 10 MEQ capsule TAKE 1 CAPSULE BY MOUTH TWICE A DAY 60 Capsule 35   • hydroCHLOROthiazide (HYDRODIURIL) 25 MG Tab TAKE 1 TABLET BY MOUTH EVERY DAY 30 Tablet 8   • metoprolol tartrate (LOPRESSOR) 50 MG Tab TAKE 1 TABLET BY MOUTH TWICE A  tablet 3   • fenofibrate (TRIGLIDE) 160 MG tablet Take 1 tablet by mouth every day. 90 tablet 4   • lisinopril (PRINIVIL) 20 MG Tab Take 1 tablet by mouth every day. 90 tablet 4   • glucosamine Sulfate 500 MG Cap Take 1 Cap by mouth 3 times a day, with meals. 270 Cap 4   • Omega-3 Fatty Acids (FISH OIL) 1000 MG CAPS capsule Take 1,000 mg by mouth 3 times a day, with meals.     • vitamin D (CHOLECALCIFEROL) 1000 UNIT TABS Take 2 Tabs by mouth every day. 30 Tab 11   • potassium chloride (MICRO-K) 10 MEQ capsule TAKE 1 CAPSULE BY MOUTH TWICE A DAY (Patient not taking: Reported on 11/4/2021) 60 Capsule 20     No facility-administered medications prior to visit.     Orders Only on 10/25/2021   Component Date Value   • WBC 10/25/2021 4.9    • RBC 10/25/2021 5.25    • Hemoglobin 10/25/2021 15.2    • Hematocrit 10/25/2021 47.4*   • MCV 10/25/2021 90    •  MCH 10/25/2021 29.0    • MCHC 10/25/2021 32.1    • RDW 10/25/2021 12.6    • Platelet Count 10/25/2021 347    • Neutrophils-Polys 10/25/2021 49    • Lymphocytes 10/25/2021 39    • Monocytes 10/25/2021 8    • Eosinophils 10/25/2021 3    • Basophils 10/25/2021 1    • Immature Cells 10/25/2021 CANCELED    • Neutrophils (Absolute) 10/25/2021 2.5    • Lymphs (Absolute) 10/25/2021 1.9    • Monos (Absolute) 10/25/2021 0.4    • Eos (Absolute) 10/25/2021 0.1    • Baso (Absolute) 10/25/2021 0.0    • Immature Granulocytes 10/25/2021 0    • Immature Granulocytes (a* 10/25/2021 0.0    • Nucleated RBC 10/25/2021 CANCELED    • Comments-Diff 10/25/2021 CANCELED    • Glucose 10/25/2021 104*   • Bun 10/25/2021 22    • Creatinine 10/25/2021 0.94    • GFR If Non  Ameri* 10/25/2021 67    • GFR If  10/25/2021 77    • Bun-Creatinine Ratio 10/25/2021 23    • Sodium 10/25/2021 141    • Potassium 10/25/2021 4.1    • Chloride 10/25/2021 101    • Co2 10/25/2021 24    • Calcium 10/25/2021 10.4*   • Total Protein 10/25/2021 7.2    • Albumin 10/25/2021 4.7    • Globulin 10/25/2021 2.5    • A-G Ratio 10/25/2021 1.9    • Total Bilirubin 10/25/2021 0.3    • Alkaline Phosphatase 10/25/2021 65    • AST(SGOT) 10/25/2021 19    • ALT(SGPT) 10/25/2021 20    • Cholesterol,Tot 10/25/2021 177    • Triglycerides 10/25/2021 143    • HDL 10/25/2021 56    • VLDL Cholesterol Calc 10/25/2021 25    • LDL Chol Calc (NIH) 10/25/2021 96    • Comment: 10/25/2021 CANCELED    • Glycohemoglobin 10/25/2021 6.0*   • TSH 10/25/2021 3.420    • 25-Hydroxy   Vitamin D 25 10/25/2021 79.5       Lab Results   Component Value Date/Time    HBA1C 6.0 (H) 10/25/2021 06:32 AM    HBA1C 6.0 (H) 04/30/2021 11:13 AM     Lab Results   Component Value Date/Time    SODIUM 141 10/25/2021 06:32 AM    SODIUM 136 02/01/2013 09:56 AM    POTASSIUM 4.1 10/25/2021 06:32 AM    POTASSIUM 3.9 02/01/2013 09:56 AM    CHLORIDE 101 10/25/2021 06:32 AM    CHLORIDE 99 02/01/2013 09:56 AM  "   CO2 24 10/25/2021 06:32 AM    CO2 23 02/01/2013 09:56 AM    GLUCOSE 104 (H) 10/25/2021 06:32 AM    GLUCOSE 91 02/01/2013 09:56 AM    BUN 22 10/25/2021 06:32 AM    BUN 14 02/01/2013 09:56 AM    CREATININE 0.94 10/25/2021 06:32 AM    CREATININE 1.04 (H) 02/01/2013 09:56 AM    BUNCREATRAT 23 10/25/2021 06:32 AM    BUNCREATRAT 13 02/01/2013 09:56 AM    ALKPHOSPHAT 65 10/25/2021 06:32 AM    ALKPHOSPHAT 51 02/01/2013 09:56 AM    ASTSGOT 19 10/25/2021 06:32 AM    ASTSGOT 17 02/01/2013 09:56 AM    ALTSGPT 20 10/25/2021 06:32 AM    ALTSGPT 14 02/01/2013 09:56 AM    TBILIRUBIN 0.3 10/25/2021 06:32 AM    TBILIRUBIN 0.9 02/01/2013 09:56 AM     No results found for: INR  Lab Results   Component Value Date/Time    CHOLSTRLTOT 177 10/25/2021 06:32 AM    CHOLSTRLTOT 155 02/01/2013 09:56 AM    LDL 79 07/14/2020 07:10 AM    LDL 77 02/01/2013 09:56 AM    HDL 56 10/25/2021 06:32 AM    HDL 51 02/01/2013 09:56 AM    TRIGLYCERIDE 143 10/25/2021 06:32 AM    TRIGLYCERIDE 136 02/01/2013 09:56 AM       No results found for: TESTOSTERONE  Lab Results   Component Value Date/Time    TSH 3.420 10/25/2021 06:32 AM    TSH 2.840 04/30/2021 11:13 AM     No results found for: FREET4  No results found for: URICACID  No components found for: VITB12  Lab Results   Component Value Date/Time    25HYDROXY 79.5 10/25/2021 06:32 AM    25HYDROXY 79.1 04/30/2021 11:13 AM             HPI    Review of Systems   Constitutional: Negative.    HENT: Negative.    Eyes: Negative.    Respiratory: Negative.    Cardiovascular: Negative.    Gastrointestinal: Negative.    Genitourinary: Negative.    Musculoskeletal: Negative.    Skin: Negative.    Neurological: Negative.    Endo/Heme/Allergies: Negative.    Psychiatric/Behavioral: Negative.               Objective     /68 (BP Location: Left arm, Patient Position: Sitting, BP Cuff Size: Adult)   Pulse 67   Temp 37 °C (98.6 °F) (Temporal)   Ht 1.702 m (5' 7\")   Wt 94.3 kg (208 lb)   SpO2 99%   BMI 32.58 kg/m²  "     Physical Exam  Vitals reviewed.   Constitutional:       General: She is not in acute distress.     Appearance: She is well-developed. She is not diaphoretic.   HENT:      Head: Normocephalic and atraumatic.      Right Ear: External ear normal.      Left Ear: External ear normal.      Nose: Nose normal.      Mouth/Throat:      Pharynx: No oropharyngeal exudate.   Eyes:      General: No scleral icterus.        Right eye: No discharge.         Left eye: No discharge.      Conjunctiva/sclera: Conjunctivae normal.      Pupils: Pupils are equal, round, and reactive to light.   Neck:      Thyroid: No thyromegaly.      Vascular: No JVD.      Trachea: No tracheal deviation.   Cardiovascular:      Rate and Rhythm: Normal rate and regular rhythm.      Heart sounds: Normal heart sounds. No murmur heard.   No friction rub. No gallop.    Pulmonary:      Effort: Pulmonary effort is normal. No respiratory distress.      Breath sounds: Normal breath sounds. No stridor. No wheezing or rales.   Chest:      Chest wall: No tenderness.   Abdominal:      General: Bowel sounds are normal. There is no distension.      Palpations: Abdomen is soft. There is no mass.      Tenderness: There is no abdominal tenderness. There is no guarding or rebound.   Musculoskeletal:         General: No tenderness. Normal range of motion.      Cervical back: Normal range of motion and neck supple.   Lymphadenopathy:      Cervical: No cervical adenopathy.   Skin:     General: Skin is warm and dry.      Coloration: Skin is not pale.      Findings: No erythema or rash.   Neurological:      Mental Status: She is alert and oriented to person, place, and time.      Cranial Nerves: No cranial nerve deficit.      Motor: No abnormal muscle tone.      Coordination: Coordination normal.      Deep Tendon Reflexes: Reflexes are normal and symmetric. Reflexes normal.   Psychiatric:         Behavior: Behavior normal.         Thought Content: Thought content normal.          Judgment: Judgment normal.                  Orders Only on 10/25/2021   Component Date Value   • WBC 10/25/2021 4.9    • RBC 10/25/2021 5.25    • Hemoglobin 10/25/2021 15.2    • Hematocrit 10/25/2021 47.4*   • MCV 10/25/2021 90    • MCH 10/25/2021 29.0    • MCHC 10/25/2021 32.1    • RDW 10/25/2021 12.6    • Platelet Count 10/25/2021 347    • Neutrophils-Polys 10/25/2021 49    • Lymphocytes 10/25/2021 39    • Monocytes 10/25/2021 8    • Eosinophils 10/25/2021 3    • Basophils 10/25/2021 1    • Immature Cells 10/25/2021 CANCELED    • Neutrophils (Absolute) 10/25/2021 2.5    • Lymphs (Absolute) 10/25/2021 1.9    • Monos (Absolute) 10/25/2021 0.4    • Eos (Absolute) 10/25/2021 0.1    • Baso (Absolute) 10/25/2021 0.0    • Immature Granulocytes 10/25/2021 0    • Immature Granulocytes (a* 10/25/2021 0.0    • Nucleated RBC 10/25/2021 CANCELED    • Comments-Diff 10/25/2021 CANCELED    • Glucose 10/25/2021 104*   • Bun 10/25/2021 22    • Creatinine 10/25/2021 0.94    • GFR If Non  Ameri* 10/25/2021 67    • GFR If  10/25/2021 77    • Bun-Creatinine Ratio 10/25/2021 23    • Sodium 10/25/2021 141    • Potassium 10/25/2021 4.1    • Chloride 10/25/2021 101    • Co2 10/25/2021 24    • Calcium 10/25/2021 10.4*   • Total Protein 10/25/2021 7.2    • Albumin 10/25/2021 4.7    • Globulin 10/25/2021 2.5    • A-G Ratio 10/25/2021 1.9    • Total Bilirubin 10/25/2021 0.3    • Alkaline Phosphatase 10/25/2021 65    • AST(SGOT) 10/25/2021 19    • ALT(SGPT) 10/25/2021 20    • Cholesterol,Tot 10/25/2021 177    • Triglycerides 10/25/2021 143    • HDL 10/25/2021 56    • VLDL Cholesterol Calc 10/25/2021 25    • LDL Chol Calc (NIH) 10/25/2021 96    • Comment: 10/25/2021 CANCELED    • Glycohemoglobin 10/25/2021 6.0*   • TSH 10/25/2021 3.420    • 25-Hydroxy   Vitamin D 25 10/25/2021 79.5       Lab Results   Component Value Date/Time    HBA1C 6.0 (H) 10/25/2021 06:32 AM    HBA1C 6.0 (H) 04/30/2021 11:13 AM     Lab Results    Component Value Date/Time    SODIUM 141 10/25/2021 06:32 AM    SODIUM 136 02/01/2013 09:56 AM    POTASSIUM 4.1 10/25/2021 06:32 AM    POTASSIUM 3.9 02/01/2013 09:56 AM    CHLORIDE 101 10/25/2021 06:32 AM    CHLORIDE 99 02/01/2013 09:56 AM    CO2 24 10/25/2021 06:32 AM    CO2 23 02/01/2013 09:56 AM    GLUCOSE 104 (H) 10/25/2021 06:32 AM    GLUCOSE 91 02/01/2013 09:56 AM    BUN 22 10/25/2021 06:32 AM    BUN 14 02/01/2013 09:56 AM    CREATININE 0.94 10/25/2021 06:32 AM    CREATININE 1.04 (H) 02/01/2013 09:56 AM    BUNCREATRAT 23 10/25/2021 06:32 AM    BUNCREATRAT 13 02/01/2013 09:56 AM    ALKPHOSPHAT 65 10/25/2021 06:32 AM    ALKPHOSPHAT 51 02/01/2013 09:56 AM    ASTSGOT 19 10/25/2021 06:32 AM    ASTSGOT 17 02/01/2013 09:56 AM    ALTSGPT 20 10/25/2021 06:32 AM    ALTSGPT 14 02/01/2013 09:56 AM    TBILIRUBIN 0.3 10/25/2021 06:32 AM    TBILIRUBIN 0.9 02/01/2013 09:56 AM     No results found for: INR  Lab Results   Component Value Date/Time    CHOLSTRLTOT 177 10/25/2021 06:32 AM    CHOLSTRLTOT 155 02/01/2013 09:56 AM    LDL 79 07/14/2020 07:10 AM    LDL 77 02/01/2013 09:56 AM    HDL 56 10/25/2021 06:32 AM    HDL 51 02/01/2013 09:56 AM    TRIGLYCERIDE 143 10/25/2021 06:32 AM    TRIGLYCERIDE 136 02/01/2013 09:56 AM       No results found for: TESTOSTERONE  Lab Results   Component Value Date/Time    TSH 3.420 10/25/2021 06:32 AM    TSH 2.840 04/30/2021 11:13 AM     No results found for: FREET4  No results found for: URICACID  No components found for: VITB12  Lab Results   Component Value Date/Time    25HYDROXY 79.5 10/25/2021 06:32 AM    25HYDROXY 79.1 04/30/2021 11:13 AM                  Assessment & Plan          1. High calcium levels  Level = 10.4 ---- it was also elevated at 10.4, good control continue same regimen 4 years ago so this appears to be stable problem.  Have never checked her PTH.  Will call results and refer to Endo if elevated.- PTH INTACT (PTH ONLY); Future    2. Hyperlipidemia, unspecified  hyperlipidemia type   Under good control. Continue same regimen.    - CBC WITH DIFFERENTIAL; Future  - Lipid Profile; Future  - Comp Metabolic Panel; Future    3. Impaired fasting glucose  Under good control. Continue same regimen.\     - HEMOGLOBIN A1C; Future    4. Vitamin D deficiency  Under good control. Continue same regimen.  \     - VITAMIN D,25 HYDROXY; Future    5. IFG (impaired fasting glucose)    diet/exercise/lose 15 lbs.; patient counseled    \       6. Obesity (BMI 30.0-34.9)     diet/exercise/lose 15 lbs.; patient counseled    - Patient identified as having weight management issue.  Appropriate orders and counseling given.

## 2022-01-29 DIAGNOSIS — I10 ESSENTIAL HYPERTENSION: ICD-10-CM

## 2022-01-30 RX ORDER — LISINOPRIL 20 MG/1
20 TABLET ORAL DAILY
Qty: 30 TABLET | Refills: 3 | Status: SHIPPED | OUTPATIENT
Start: 2022-01-30 | End: 2022-06-01

## 2022-02-27 DIAGNOSIS — E78.5 HYPERLIPIDEMIA, UNSPECIFIED HYPERLIPIDEMIA TYPE: ICD-10-CM

## 2022-02-28 RX ORDER — FENOFIBRATE 160 MG/1
160 TABLET ORAL
Qty: 30 TABLET | Refills: 14 | Status: SHIPPED | OUTPATIENT
Start: 2022-02-28 | End: 2023-03-01

## 2022-04-29 ENCOUNTER — APPOINTMENT (OUTPATIENT)
Dept: LAB | Facility: MEDICAL CENTER | Age: 62
End: 2022-04-29

## 2022-04-30 LAB
25(OH)D3+25(OH)D2 SERPL-MCNC: 85 NG/ML (ref 30–100)
ALBUMIN SERPL-MCNC: 4.8 G/DL (ref 3.8–4.9)
ALBUMIN/GLOB SERPL: 2.4 {RATIO} (ref 1.2–2.2)
ALP SERPL-CCNC: 60 IU/L (ref 44–121)
ALT SERPL-CCNC: 18 IU/L (ref 0–32)
AST SERPL-CCNC: 18 IU/L (ref 0–40)
BASOPHILS # BLD AUTO: 0 X10E3/UL (ref 0–0.2)
BASOPHILS NFR BLD AUTO: 1 %
BILIRUB SERPL-MCNC: 0.4 MG/DL (ref 0–1.2)
BUN SERPL-MCNC: 17 MG/DL (ref 6–24)
BUN/CREAT SERPL: 21 (ref 9–23)
CALCIUM SERPL-MCNC: 10.4 MG/DL (ref 8.7–10.2)
CHLORIDE SERPL-SCNC: 104 MMOL/L (ref 96–106)
CHOLEST SERPL-MCNC: 144 MG/DL (ref 100–199)
CO2 SERPL-SCNC: 21 MMOL/L (ref 20–29)
CREAT SERPL-MCNC: 0.81 MG/DL (ref 0.57–1)
EGFRCR SERPLBLD CKD-EPI 2021: 84 ML/MIN/1.73
EOSINOPHIL # BLD AUTO: 0.1 X10E3/UL (ref 0–0.4)
EOSINOPHIL NFR BLD AUTO: 2 %
ERYTHROCYTE [DISTWIDTH] IN BLOOD BY AUTOMATED COUNT: 12.6 % (ref 11.7–15.4)
GLOBULIN SER CALC-MCNC: 2 G/DL (ref 1.5–4.5)
GLUCOSE SERPL-MCNC: 98 MG/DL (ref 65–99)
HBA1C MFR BLD: 5.9 % (ref 4.8–5.6)
HCT VFR BLD AUTO: 46.5 % (ref 34–46.6)
HDLC SERPL-MCNC: 52 MG/DL
HGB BLD-MCNC: 15.6 G/DL (ref 11.1–15.9)
IMM GRANULOCYTES # BLD AUTO: 0 X10E3/UL (ref 0–0.1)
IMM GRANULOCYTES NFR BLD AUTO: 0 %
IMMATURE CELLS  115398: NORMAL
LABORATORY COMMENT REPORT: NORMAL
LDLC SERPL CALC-MCNC: 70 MG/DL (ref 0–99)
LYMPHOCYTES # BLD AUTO: 1.8 X10E3/UL (ref 0.7–3.1)
LYMPHOCYTES NFR BLD AUTO: 35 %
MCH RBC QN AUTO: 30.4 PG (ref 26.6–33)
MCHC RBC AUTO-ENTMCNC: 33.5 G/DL (ref 31.5–35.7)
MCV RBC AUTO: 91 FL (ref 79–97)
MONOCYTES # BLD AUTO: 0.5 X10E3/UL (ref 0.1–0.9)
MONOCYTES NFR BLD AUTO: 9 %
MORPHOLOGY BLD-IMP: NORMAL
NEUTROPHILS # BLD AUTO: 2.7 X10E3/UL (ref 1.4–7)
NEUTROPHILS NFR BLD AUTO: 53 %
NRBC BLD AUTO-RTO: NORMAL %
PLATELET # BLD AUTO: 342 X10E3/UL (ref 150–450)
POTASSIUM SERPL-SCNC: 4 MMOL/L (ref 3.5–5.2)
PROT SERPL-MCNC: 6.8 G/DL (ref 6–8.5)
RBC # BLD AUTO: 5.14 X10E6/UL (ref 3.77–5.28)
SODIUM SERPL-SCNC: 141 MMOL/L (ref 134–144)
TRIGL SERPL-MCNC: 123 MG/DL (ref 0–149)
VLDLC SERPL CALC-MCNC: 22 MG/DL (ref 5–40)
WBC # BLD AUTO: 5.1 X10E3/UL (ref 3.4–10.8)

## 2022-05-04 ENCOUNTER — OFFICE VISIT (OUTPATIENT)
Dept: MEDICAL GROUP | Age: 62
End: 2022-05-04

## 2022-05-04 VITALS
BODY MASS INDEX: 32.43 KG/M2 | WEIGHT: 206.6 LBS | HEART RATE: 61 BPM | TEMPERATURE: 98 F | DIASTOLIC BLOOD PRESSURE: 84 MMHG | RESPIRATION RATE: 16 BRPM | HEIGHT: 67 IN | OXYGEN SATURATION: 97 % | SYSTOLIC BLOOD PRESSURE: 130 MMHG

## 2022-05-04 DIAGNOSIS — R73.01 IFG (IMPAIRED FASTING GLUCOSE): ICD-10-CM

## 2022-05-04 DIAGNOSIS — E78.2 MIXED HYPERLIPIDEMIA: ICD-10-CM

## 2022-05-04 DIAGNOSIS — E78.5 HYPERLIPIDEMIA, UNSPECIFIED HYPERLIPIDEMIA TYPE: ICD-10-CM

## 2022-05-04 DIAGNOSIS — E83.52 HIGH CALCIUM LEVELS: ICD-10-CM

## 2022-05-04 DIAGNOSIS — E55.9 VITAMIN D DEFICIENCY: ICD-10-CM

## 2022-05-04 DIAGNOSIS — Z12.31 ENCOUNTER FOR SCREENING MAMMOGRAM FOR BREAST CANCER: ICD-10-CM

## 2022-05-04 DIAGNOSIS — E66.9 OBESITY (BMI 30-39.9): ICD-10-CM

## 2022-05-04 PROCEDURE — 99214 OFFICE O/P EST MOD 30 MIN: CPT | Performed by: INTERNAL MEDICINE

## 2022-05-04 ASSESSMENT — ENCOUNTER SYMPTOMS
RESPIRATORY NEGATIVE: 1
NEUROLOGICAL NEGATIVE: 1
CONSTITUTIONAL NEGATIVE: 1
EYES NEGATIVE: 1
CARDIOVASCULAR NEGATIVE: 1
PSYCHIATRIC NEGATIVE: 1
MUSCULOSKELETAL NEGATIVE: 1
GASTROINTESTINAL NEGATIVE: 1

## 2022-05-04 ASSESSMENT — FIBROSIS 4 INDEX: FIB4 SCORE: 0.73

## 2022-05-04 ASSESSMENT — PATIENT HEALTH QUESTIONNAIRE - PHQ9: CLINICAL INTERPRETATION OF PHQ2 SCORE: 0

## 2022-05-04 NOTE — PROGRESS NOTES
Subjective     Desirae Paris is a 61 y.o. female who presents with Follow-Up (6 month)  The patient is here for followup of chronic medical problems listed below. The patient is compliant with medications and having no side effects from them. Denies chest pain, abdominal pain, dyspnea, myalgias, or cough.   Patient Active Problem List    Diagnosis Date Noted   • Obesity (BMI 30-39.9) 11/03/2020   • Knee clicking- right 07/12/2019   • Primary osteoarthritis of both knees 05/18/2018   • Obesity (BMI 30.0-34.9) 05/18/2017   • Potassium (K) deficiency 07/06/2016   • Essential hypertension 01/18/2012   • IFG (impaired fasting glucose) 01/18/2012   • Mixed hyperlipidemia 01/18/2012     No Known Allergies  Outpatient Medications Prior to Visit   Medication Sig Dispense Refill   • fenofibrate (TRIGLIDE) 160 MG tablet TAKE 1 TABLET BY MOUTH EVERY DAY 30 Tablet 14   • lisinopril (PRINIVIL) 20 MG Tab TAKE 1 TABLET BY MOUTH EVERY DAY 30 Tablet 3   • potassium chloride (MICRO-K) 10 MEQ capsule TAKE 1 CAPSULE BY MOUTH TWICE A DAY 60 Capsule 35   • hydroCHLOROthiazide (HYDRODIURIL) 25 MG Tab TAKE 1 TABLET BY MOUTH EVERY DAY 30 Tablet 8   • metoprolol tartrate (LOPRESSOR) 50 MG Tab TAKE 1 TABLET BY MOUTH TWICE A  tablet 3   • glucosamine Sulfate 500 MG Cap Take 1 Cap by mouth 3 times a day, with meals. 270 Cap 4   • Omega-3 Fatty Acids (FISH OIL) 1000 MG CAPS capsule Take 1,000 mg by mouth 3 times a day, with meals.     • vitamin D (CHOLECALCIFEROL) 1000 UNIT TABS Take 2 Tabs by mouth every day. 30 Tab 11     No facility-administered medications prior to visit.     No visits with results within 1 Month(s) from this visit.   Latest known visit with results is:   Hospital Outpatient Visit on 11/04/2021   Component Date Value   • Pth, Intact 11/04/2021 27.7       Lab Results   Component Value Date/Time    HBA1C 5.9 (H) 04/29/2022 07:50 AM    HBA1C 6.0 (H) 10/25/2021 06:32 AM     Lab Results   Component Value Date/Time    SODIUM  141 04/29/2022 07:50 AM    SODIUM 136 02/01/2013 09:56 AM    POTASSIUM 4.0 04/29/2022 07:50 AM    POTASSIUM 3.9 02/01/2013 09:56 AM    CHLORIDE 104 04/29/2022 07:50 AM    CHLORIDE 99 02/01/2013 09:56 AM    CO2 21 04/29/2022 07:50 AM    CO2 23 02/01/2013 09:56 AM    GLUCOSE 98 04/29/2022 07:50 AM    GLUCOSE 91 02/01/2013 09:56 AM    BUN 17 04/29/2022 07:50 AM    BUN 14 02/01/2013 09:56 AM    CREATININE 0.81 04/29/2022 07:50 AM    CREATININE 1.04 (H) 02/01/2013 09:56 AM    BUNCREATRAT 21 04/29/2022 07:50 AM    BUNCREATRAT 13 02/01/2013 09:56 AM    ALKPHOSPHAT 60 04/29/2022 07:50 AM    ALKPHOSPHAT 51 02/01/2013 09:56 AM    ASTSGOT 18 04/29/2022 07:50 AM    ASTSGOT 17 02/01/2013 09:56 AM    ALTSGPT 18 04/29/2022 07:50 AM    ALTSGPT 14 02/01/2013 09:56 AM    TBILIRUBIN 0.4 04/29/2022 07:50 AM    TBILIRUBIN 0.9 02/01/2013 09:56 AM     No results found for: INR  Lab Results   Component Value Date/Time    CHOLSTRLTOT 144 04/29/2022 07:50 AM    CHOLSTRLTOT 155 02/01/2013 09:56 AM    LDL 79 07/14/2020 07:10 AM    LDL 77 02/01/2013 09:56 AM    HDL 52 04/29/2022 07:50 AM    HDL 51 02/01/2013 09:56 AM    TRIGLYCERIDE 123 04/29/2022 07:50 AM    TRIGLYCERIDE 136 02/01/2013 09:56 AM       No results found for: TESTOSTERONE  Lab Results   Component Value Date/Time    TSH 3.420 10/25/2021 06:32 AM    TSH 2.840 04/30/2021 11:13 AM     No results found for: FREET4  No results found for: URICACID  No components found for: VITB12  Lab Results   Component Value Date/Time    25HYDROXY 85.0 04/29/2022 07:50 AM    25HYDROXY 79.5 10/25/2021 06:32 AM               HPI    Review of Systems   Constitutional: Negative.    HENT: Negative.    Eyes: Negative.    Respiratory: Negative.    Cardiovascular: Negative.    Gastrointestinal: Negative.    Genitourinary: Negative.    Musculoskeletal: Negative.    Skin: Negative.    Neurological: Negative.    Endo/Heme/Allergies: Negative.    Psychiatric/Behavioral: Negative.               Objective     BP  "130/84 (BP Location: Left arm, Patient Position: Sitting, BP Cuff Size: Large adult)   Pulse 61   Temp 36.7 °C (98 °F) (Temporal)   Resp 16   Ht 1.702 m (5' 7\")   Wt 93.7 kg (206 lb 9.6 oz)   SpO2 97%   BMI 32.36 kg/m²      Physical Exam  Vitals and nursing note reviewed.   Constitutional:       Appearance: She is well-developed.   HENT:      Head: Normocephalic and atraumatic.      Right Ear: External ear normal.      Left Ear: External ear normal.      Nose: Nose normal.   Eyes:      General: No scleral icterus.        Right eye: No discharge.         Left eye: No discharge.      Conjunctiva/sclera: Conjunctivae normal.      Pupils: Pupils are equal, round, and reactive to light.   Neck:      Thyroid: No thyromegaly.      Vascular: No JVD.      Trachea: No tracheal deviation.   Cardiovascular:      Rate and Rhythm: Normal rate and regular rhythm.      Heart sounds: Normal heart sounds.     No friction rub. No gallop.   Pulmonary:      Effort: Pulmonary effort is normal. No respiratory distress.      Breath sounds: Normal breath sounds. No stridor. No wheezing or rales.   Chest:      Chest wall: No tenderness.   Abdominal:      General: Bowel sounds are normal. There is no distension.      Palpations: Abdomen is soft. There is no mass.      Tenderness: There is no abdominal tenderness. There is no guarding or rebound.      Hernia: No hernia is present.   Musculoskeletal:         General: No tenderness. Normal range of motion.      Cervical back: Normal range of motion and neck supple.   Lymphadenopathy:      Cervical: No cervical adenopathy.   Skin:     General: Skin is warm and dry.      Coloration: Skin is not pale.      Findings: No erythema or rash.   Neurological:      Mental Status: She is alert and oriented to person, place, and time.      Cranial Nerves: No cranial nerve deficit.      Coordination: Coordination normal.      Deep Tendon Reflexes: Reflexes are normal and symmetric. Reflexes normal. "   Psychiatric:         Behavior: Behavior normal.         Thought Content: Thought content normal.         Judgment: Judgment normal.                             Assessment & Plan   1. Hyperlipidemia, unspecified hyperlipidemia type      Under good control. Continue same regimen.    - TSH; Future  - Comp Metabolic Panel; Future  - Lipid Profile; Future  - CBC WITH DIFFERENTIAL; Future    2. High calcium levels-stable and probably secondary to diuretic therapy    But since the level still borderline elevated since she is doing well with her diuretic for blood pressure control we will discontinue surveillance for now.  Should become an issue in the future we can discontinue the thiazide and switch to something else for BP control       Under good control. Continue same regimen.  - Comp Metabolic Panel; Future    3. IFG (impaired fasting glucose)  Good control continue current regimen  - HEMOGLOBIN A1C; Future    4. Vitamin D deficiency  Good control continue same regimen- VITAMIN D,25 HYDROXY; Future    5. Obesity (BMI 30-39.9)    diet/exercise/lose 15 lbs.; patient counseled      6. Mixed hyperlipidemia   Under good control. Continue same regimen.]    7. Encounter for screening mammogram for breast cancer     - MA-SCREENING MAMMO BILAT W/CAD; Future       There are no diagnoses linked to this encounter.

## 2022-05-31 DIAGNOSIS — I10 ESSENTIAL HYPERTENSION: ICD-10-CM

## 2022-06-01 DIAGNOSIS — I10 ESSENTIAL HYPERTENSION: ICD-10-CM

## 2022-06-01 RX ORDER — LISINOPRIL 20 MG/1
20 TABLET ORAL DAILY
Qty: 90 TABLET | Refills: 3 | Status: SHIPPED | OUTPATIENT
Start: 2022-06-01 | End: 2022-09-27

## 2022-06-01 RX ORDER — LISINOPRIL 20 MG/1
20 TABLET ORAL DAILY
Qty: 30 TABLET | Refills: 3 | Status: SHIPPED | OUTPATIENT
Start: 2022-06-01 | End: 2022-06-01 | Stop reason: SDUPTHER

## 2022-06-07 ENCOUNTER — APPOINTMENT (OUTPATIENT)
Dept: RADIOLOGY | Facility: MEDICAL CENTER | Age: 62
End: 2022-06-07
Attending: INTERNAL MEDICINE

## 2022-06-07 DIAGNOSIS — Z12.31 VISIT FOR SCREENING MAMMOGRAM: ICD-10-CM

## 2022-08-13 DIAGNOSIS — I10 ESSENTIAL HYPERTENSION: ICD-10-CM

## 2022-08-16 RX ORDER — METOPROLOL TARTRATE 50 MG/1
TABLET, FILM COATED ORAL
Qty: 60 TABLET | Refills: 2 | Status: SHIPPED | OUTPATIENT
Start: 2022-08-16 | End: 2022-11-02

## 2022-08-16 RX ORDER — HYDROCHLOROTHIAZIDE 25 MG/1
TABLET ORAL
Qty: 30 TABLET | Refills: 8 | Status: SHIPPED | OUTPATIENT
Start: 2022-08-16 | End: 2023-04-21 | Stop reason: SDUPTHER

## 2022-09-26 DIAGNOSIS — I10 ESSENTIAL HYPERTENSION: ICD-10-CM

## 2022-09-27 RX ORDER — LISINOPRIL 20 MG/1
20 TABLET ORAL DAILY
Qty: 90 TABLET | Refills: 3 | Status: SHIPPED | OUTPATIENT
Start: 2022-09-27

## 2022-10-26 DIAGNOSIS — I10 ESSENTIAL HYPERTENSION: ICD-10-CM

## 2022-10-26 DIAGNOSIS — E87.6 POTASSIUM (K) DEFICIENCY: ICD-10-CM

## 2022-10-27 RX ORDER — POTASSIUM CHLORIDE 750 MG/1
CAPSULE, EXTENDED RELEASE ORAL
Qty: 60 CAPSULE | Refills: 25 | Status: SHIPPED | OUTPATIENT
Start: 2022-10-27

## 2022-11-01 LAB
25(OH)D3+25(OH)D2 SERPL-MCNC: 94.2 NG/ML (ref 30–100)
ALBUMIN SERPL-MCNC: 4.4 G/DL (ref 3.8–4.9)
ALBUMIN/GLOB SERPL: 1.7 {RATIO} (ref 1.2–2.2)
ALP SERPL-CCNC: 65 IU/L (ref 44–121)
ALT SERPL-CCNC: 22 IU/L (ref 0–32)
AST SERPL-CCNC: 19 IU/L (ref 0–40)
BASOPHILS # BLD AUTO: 0 X10E3/UL (ref 0–0.2)
BASOPHILS NFR BLD AUTO: 1 %
BILIRUB SERPL-MCNC: 0.3 MG/DL (ref 0–1.2)
BUN SERPL-MCNC: 23 MG/DL (ref 8–27)
BUN/CREAT SERPL: 23 (ref 12–28)
CALCIUM SERPL-MCNC: 10.1 MG/DL (ref 8.7–10.3)
CHLORIDE SERPL-SCNC: 104 MMOL/L (ref 96–106)
CHOLEST SERPL-MCNC: 161 MG/DL (ref 100–199)
CO2 SERPL-SCNC: 25 MMOL/L (ref 20–29)
CREAT SERPL-MCNC: 0.99 MG/DL (ref 0.57–1)
EGFRCR SERPLBLD CKD-EPI 2021: 65 ML/MIN/1.73
EOSINOPHIL # BLD AUTO: 0.1 X10E3/UL (ref 0–0.4)
EOSINOPHIL NFR BLD AUTO: 3 %
ERYTHROCYTE [DISTWIDTH] IN BLOOD BY AUTOMATED COUNT: 12.6 % (ref 11.7–15.4)
GLOBULIN SER CALC-MCNC: 2.6 G/DL (ref 1.5–4.5)
GLUCOSE SERPL-MCNC: 108 MG/DL (ref 70–99)
HBA1C MFR BLD: 5.9 % (ref 4.8–5.6)
HCT VFR BLD AUTO: 44.7 % (ref 34–46.6)
HDLC SERPL-MCNC: 59 MG/DL
HGB BLD-MCNC: 14.7 G/DL (ref 11.1–15.9)
IMM GRANULOCYTES # BLD AUTO: 0 X10E3/UL (ref 0–0.1)
IMM GRANULOCYTES NFR BLD AUTO: 0 %
IMMATURE CELLS  115398: NORMAL
LABORATORY COMMENT REPORT: NORMAL
LDLC SERPL CALC-MCNC: 83 MG/DL (ref 0–99)
LYMPHOCYTES # BLD AUTO: 1.9 X10E3/UL (ref 0.7–3.1)
LYMPHOCYTES NFR BLD AUTO: 38 %
MCH RBC QN AUTO: 29.6 PG (ref 26.6–33)
MCHC RBC AUTO-ENTMCNC: 32.9 G/DL (ref 31.5–35.7)
MCV RBC AUTO: 90 FL (ref 79–97)
MONOCYTES # BLD AUTO: 0.4 X10E3/UL (ref 0.1–0.9)
MONOCYTES NFR BLD AUTO: 9 %
MORPHOLOGY BLD-IMP: NORMAL
NEUTROPHILS # BLD AUTO: 2.5 X10E3/UL (ref 1.4–7)
NEUTROPHILS NFR BLD AUTO: 49 %
NRBC BLD AUTO-RTO: NORMAL %
PLATELET # BLD AUTO: 326 X10E3/UL (ref 150–450)
POTASSIUM SERPL-SCNC: 4.2 MMOL/L (ref 3.5–5.2)
PROT SERPL-MCNC: 7 G/DL (ref 6–8.5)
RBC # BLD AUTO: 4.97 X10E6/UL (ref 3.77–5.28)
SODIUM SERPL-SCNC: 141 MMOL/L (ref 134–144)
TRIGL SERPL-MCNC: 104 MG/DL (ref 0–149)
TSH SERPL DL<=0.005 MIU/L-ACNC: 2.41 UIU/ML (ref 0.45–4.5)
VLDLC SERPL CALC-MCNC: 19 MG/DL (ref 5–40)
WBC # BLD AUTO: 4.9 X10E3/UL (ref 3.4–10.8)

## 2022-11-02 DIAGNOSIS — I10 ESSENTIAL HYPERTENSION: ICD-10-CM

## 2022-11-02 RX ORDER — METOPROLOL TARTRATE 50 MG/1
50 TABLET, FILM COATED ORAL 2 TIMES DAILY
Qty: 60 TABLET | Refills: 2 | Status: CANCELLED | OUTPATIENT
Start: 2022-11-02

## 2022-11-02 RX ORDER — METOPROLOL TARTRATE 50 MG/1
TABLET, FILM COATED ORAL
Qty: 60 TABLET | Refills: 1 | Status: SHIPPED | OUTPATIENT
Start: 2022-11-02

## 2022-11-02 RX ORDER — METOPROLOL TARTRATE 50 MG/1
50 TABLET, FILM COATED ORAL 2 TIMES DAILY
Qty: 180 TABLET | Refills: 3 | Status: SHIPPED | OUTPATIENT
Start: 2022-11-02 | End: 2023-03-23

## 2022-11-08 ENCOUNTER — OFFICE VISIT (OUTPATIENT)
Dept: MEDICAL GROUP | Age: 62
End: 2022-11-08
Payer: OTHER MISCELLANEOUS

## 2022-11-08 VITALS
HEART RATE: 59 BPM | RESPIRATION RATE: 16 BRPM | WEIGHT: 207 LBS | TEMPERATURE: 98.3 F | OXYGEN SATURATION: 96 % | DIASTOLIC BLOOD PRESSURE: 76 MMHG | SYSTOLIC BLOOD PRESSURE: 140 MMHG | BODY MASS INDEX: 32.49 KG/M2 | HEIGHT: 67 IN

## 2022-11-08 DIAGNOSIS — I10 ESSENTIAL HYPERTENSION: ICD-10-CM

## 2022-11-08 DIAGNOSIS — Z12.11 SCREENING FOR COLORECTAL CANCER: ICD-10-CM

## 2022-11-08 DIAGNOSIS — Z12.12 SCREENING FOR COLORECTAL CANCER: ICD-10-CM

## 2022-11-08 DIAGNOSIS — R73.01 IMPAIRED FASTING GLUCOSE: ICD-10-CM

## 2022-11-08 DIAGNOSIS — E66.9 OBESITY (BMI 30.0-34.9): ICD-10-CM

## 2022-11-08 DIAGNOSIS — E78.2 MIXED HYPERLIPIDEMIA: ICD-10-CM

## 2022-11-08 PROBLEM — R29.898 KNEE CLICKING: Status: RESOLVED | Noted: 2019-07-12 | Resolved: 2022-11-08

## 2022-11-08 PROCEDURE — 99214 OFFICE O/P EST MOD 30 MIN: CPT | Performed by: INTERNAL MEDICINE

## 2022-11-08 ASSESSMENT — ENCOUNTER SYMPTOMS
GASTROINTESTINAL NEGATIVE: 1
NEUROLOGICAL NEGATIVE: 1
RESPIRATORY NEGATIVE: 1
MUSCULOSKELETAL NEGATIVE: 1
CONSTITUTIONAL NEGATIVE: 1
PSYCHIATRIC NEGATIVE: 1
CARDIOVASCULAR NEGATIVE: 1
EYES NEGATIVE: 1

## 2022-11-08 ASSESSMENT — FIBROSIS 4 INDEX: FIB4 SCORE: 0.75

## 2022-11-09 NOTE — PROGRESS NOTES
Subjective     Desirae Paris is a 62 y.o. female who presents with Follow-Up (Lab review )  The patient is here for followup of chronic medical problems listed below. The patient is compliant with medications and having no side effects from them. Denies chest pain, abdominal pain, dyspnea, myalgias, or cough.   No problem-specific Assessment & Plan notes found for this encounter.  Outpatient Medications Prior to Visit   Medication Sig Dispense Refill    metoprolol tartrate (LOPRESSOR) 50 MG Tab TAKE 1 TABLET BY MOUTH TWICE A DAY 60 Tablet 1    metoprolol tartrate (LOPRESSOR) 50 MG Tab Take 1 Tablet by mouth 2 times a day. 180 Tablet 3    potassium chloride (MICRO-K) 10 MEQ capsule TAKE 1 CAPSULE BY MOUTH TWICE A DAY 60 Capsule 25    lisinopril (PRINIVIL) 20 MG Tab TAKE 1 TABLET BY MOUTH EVERY DAY 90 Tablet 3    hydroCHLOROthiazide (HYDRODIURIL) 25 MG Tab TAKE 1 TABLET BY MOUTH EVERY DAY 30 Tablet 8    fenofibrate (TRIGLIDE) 160 MG tablet TAKE 1 TABLET BY MOUTH EVERY DAY 30 Tablet 14    glucosamine Sulfate 500 MG Cap Take 1 Cap by mouth 3 times a day, with meals. 270 Cap 4    Omega-3 Fatty Acids (FISH OIL) 1000 MG CAPS capsule Take 1 Capsule by mouth 3 times a day with meals.      vitamin D (CHOLECALCIFEROL) 1000 UNIT TABS Take 2 Tabs by mouth every day. 30 Tab 11     No facility-administered medications prior to visit.     No Known Allergies  Orders Only on 10/31/2022   Component Date Value    WBC 10/31/2022 4.9     RBC 10/31/2022 4.97     Hemoglobin 10/31/2022 14.7     Hematocrit 10/31/2022 44.7     MCV 10/31/2022 90     MCH 10/31/2022 29.6     MCHC 10/31/2022 32.9     RDW 10/31/2022 12.6     Platelet Count 10/31/2022 326     Neutrophils-Polys 10/31/2022 49     Lymphocytes 10/31/2022 38     Monocytes 10/31/2022 9     Eosinophils 10/31/2022 3     Basophils 10/31/2022 1     Immature Cells 10/31/2022 CANCELED     Neutrophils (Absolute) 10/31/2022 2.5     Lymphs (Absolute) 10/31/2022 1.9     Monos (Absolute) 10/31/2022  "0.4     Eos (Absolute) 10/31/2022 0.1     Baso (Absolute) 10/31/2022 0.0     Immature Granulocytes 10/31/2022 0     Immature Granulocytes (a* 10/31/2022 0.0     Nucleated RBC 10/31/2022 CANCELED     Comments-Diff 10/31/2022 CANCELED     Glucose 10/31/2022 108 (H)     Bun 10/31/2022 23     Creatinine 10/31/2022 0.99     eGFR 10/31/2022 65     Bun-Creatinine Ratio 10/31/2022 23     Sodium 10/31/2022 141     Potassium 10/31/2022 4.2     Chloride 10/31/2022 104     Co2 10/31/2022 25     Calcium 10/31/2022 10.1     Total Protein 10/31/2022 7.0     Albumin 10/31/2022 4.4     Globulin 10/31/2022 2.6     A-G Ratio 10/31/2022 1.7     Total Bilirubin 10/31/2022 0.3     Alkaline Phosphatase 10/31/2022 65     AST(SGOT) 10/31/2022 19     ALT(SGPT) 10/31/2022 22     Cholesterol,Tot 10/31/2022 161     Triglycerides 10/31/2022 104     HDL 10/31/2022 59     VLDL Cholesterol Calc 10/31/2022 19     LDL Chol Calc ([facility]) 10/31/2022 83     Comment: 10/31/2022 CANCELED     Glycohemoglobin 10/31/2022 5.9 (H)     TSH 10/31/2022 2.410     25-Hydroxy   Vitamin D 25 10/31/2022 94.2       .alll          HPI    Review of Systems   Constitutional: Negative.    HENT: Negative.     Eyes: Negative.    Respiratory: Negative.     Cardiovascular: Negative.    Gastrointestinal: Negative.    Genitourinary: Negative.    Musculoskeletal: Negative.    Skin: Negative.    Neurological: Negative.    Endo/Heme/Allergies: Negative.    Psychiatric/Behavioral: Negative.              Objective     BP (!) 140/76 (BP Location: Right arm, Patient Position: Sitting, BP Cuff Size: Adult)   Pulse (!) 59   Temp 36.8 °C (98.3 °F) (Temporal)   Resp 16   Ht 1.702 m (5' 7\")   Wt 93.9 kg (207 lb)   SpO2 96%   BMI 32.42 kg/m²      Physical Exam  Vitals reviewed.   Constitutional:       General: She is not in acute distress.     Appearance: She is well-developed. She is not diaphoretic.   HENT:      Head: Normocephalic and atraumatic.      Right Ear: External ear normal. "      Left Ear: External ear normal.      Nose: Nose normal.      Mouth/Throat:      Pharynx: No oropharyngeal exudate.   Eyes:      General: No scleral icterus.        Right eye: No discharge.         Left eye: No discharge.      Conjunctiva/sclera: Conjunctivae normal.      Pupils: Pupils are equal, round, and reactive to light.   Neck:      Thyroid: No thyromegaly.      Vascular: No JVD.      Trachea: No tracheal deviation.   Cardiovascular:      Rate and Rhythm: Normal rate and regular rhythm.      Heart sounds: Normal heart sounds. No murmur heard.    No friction rub. No gallop.   Pulmonary:      Effort: Pulmonary effort is normal. No respiratory distress.      Breath sounds: Normal breath sounds. No stridor. No wheezing or rales.   Chest:      Chest wall: No tenderness.   Abdominal:      General: Bowel sounds are normal. There is no distension.      Palpations: Abdomen is soft. There is no mass.      Tenderness: There is no abdominal tenderness. There is no guarding or rebound.   Musculoskeletal:         General: No tenderness. Normal range of motion.      Cervical back: Normal range of motion and neck supple.   Lymphadenopathy:      Cervical: No cervical adenopathy.   Skin:     General: Skin is warm and dry.      Coloration: Skin is not pale.      Findings: No erythema or rash.   Neurological:      Mental Status: She is alert and oriented to person, place, and time.      Cranial Nerves: No cranial nerve deficit.      Motor: No abnormal muscle tone.      Coordination: Coordination normal.      Deep Tendon Reflexes: Reflexes are normal and symmetric. Reflexes normal.   Psychiatric:         Behavior: Behavior normal.         Thought Content: Thought content normal.         Judgment: Judgment normal.     Orders Only on 10/31/2022   Component Date Value    WBC 10/31/2022 4.9     RBC 10/31/2022 4.97     Hemoglobin 10/31/2022 14.7     Hematocrit 10/31/2022 44.7     MCV 10/31/2022 90     MCH 10/31/2022 29.6     MCHC  10/31/2022 32.9     RDW 10/31/2022 12.6     Platelet Count 10/31/2022 326     Neutrophils-Polys 10/31/2022 49     Lymphocytes 10/31/2022 38     Monocytes 10/31/2022 9     Eosinophils 10/31/2022 3     Basophils 10/31/2022 1     Immature Cells 10/31/2022 CANCELED     Neutrophils (Absolute) 10/31/2022 2.5     Lymphs (Absolute) 10/31/2022 1.9     Monos (Absolute) 10/31/2022 0.4     Eos (Absolute) 10/31/2022 0.1     Baso (Absolute) 10/31/2022 0.0     Immature Granulocytes 10/31/2022 0     Immature Granulocytes (a* 10/31/2022 0.0     Nucleated RBC 10/31/2022 CANCELED     Comments-Diff 10/31/2022 CANCELED     Glucose 10/31/2022 108 (H)     Bun 10/31/2022 23     Creatinine 10/31/2022 0.99     eGFR 10/31/2022 65     Bun-Creatinine Ratio 10/31/2022 23     Sodium 10/31/2022 141     Potassium 10/31/2022 4.2     Chloride 10/31/2022 104     Co2 10/31/2022 25     Calcium 10/31/2022 10.1     Total Protein 10/31/2022 7.0     Albumin 10/31/2022 4.4     Globulin 10/31/2022 2.6     A-G Ratio 10/31/2022 1.7     Total Bilirubin 10/31/2022 0.3     Alkaline Phosphatase 10/31/2022 65     AST(SGOT) 10/31/2022 19     ALT(SGPT) 10/31/2022 22     Cholesterol,Tot 10/31/2022 161     Triglycerides 10/31/2022 104     HDL 10/31/2022 59     VLDL Cholesterol Calc 10/31/2022 19     LDL Chol Calc ([facility]) 10/31/2022 83     Comment: 10/31/2022 CANCELED     Glycohemoglobin 10/31/2022 5.9 (H)     TSH 10/31/2022 2.410     25-Hydroxy   Vitamin D 25 10/31/2022 94.2       Lab Results   Component Value Date/Time    HBA1C 5.9 (H) 10/31/2022 06:14 AM    HBA1C 5.9 (H) 04/29/2022 07:50 AM     Lab Results   Component Value Date/Time    SODIUM 141 10/31/2022 06:14 AM    SODIUM 136 02/01/2013 09:56 AM    POTASSIUM 4.2 10/31/2022 06:14 AM    POTASSIUM 3.9 02/01/2013 09:56 AM    CHLORIDE 104 10/31/2022 06:14 AM    CHLORIDE 99 02/01/2013 09:56 AM    CO2 25 10/31/2022 06:14 AM    CO2 23 02/01/2013 09:56 AM    GLUCOSE 108 (H) 10/31/2022 06:14 AM    GLUCOSE 91 02/01/2013 09:56  AM    BUN 23 10/31/2022 06:14 AM    BUN 14 02/01/2013 09:56 AM    CREATININE 0.99 10/31/2022 06:14 AM    CREATININE 1.04 (H) 02/01/2013 09:56 AM    BUNCREATRAT 23 10/31/2022 06:14 AM    BUNCREATRAT 13 02/01/2013 09:56 AM    ALKPHOSPHAT 65 10/31/2022 06:14 AM    ALKPHOSPHAT 51 02/01/2013 09:56 AM    ASTSGOT 19 10/31/2022 06:14 AM    ASTSGOT 17 02/01/2013 09:56 AM    ALTSGPT 22 10/31/2022 06:14 AM    ALTSGPT 14 02/01/2013 09:56 AM    TBILIRUBIN 0.3 10/31/2022 06:14 AM    TBILIRUBIN 0.9 02/01/2013 09:56 AM     No results found for: INR  Lab Results   Component Value Date/Time    CHOLSTRLTOT 161 10/31/2022 06:14 AM    CHOLSTRLTOT 155 02/01/2013 09:56 AM    LDL 79 07/14/2020 07:10 AM    LDL 77 02/01/2013 09:56 AM    HDL 59 10/31/2022 06:14 AM    HDL 51 02/01/2013 09:56 AM    TRIGLYCERIDE 104 10/31/2022 06:14 AM    TRIGLYCERIDE 136 02/01/2013 09:56 AM       No results found for: TESTOSTERONE  Lab Results   Component Value Date/Time    TSH 2.410 10/31/2022 06:14 AM    TSH 3.420 10/25/2021 06:32 AM     No results found for: FREET4  No results found for: URICACID  No components found for: VITB12  Lab Results   Component Value Date/Time    25HYDROXY 94.2 10/31/2022 06:14 AM    25HYDROXY 85.0 04/29/2022 07:50 AM                            Assessment & Plan        1. Essential hypertension  Is not at goal.  She will do home BP monitoring twice daily and keep a log and return in 2 weeks without log and/or MyChart message us with the results and if not at goal start on amlodipine low-dose.  Otherwise stay on current regimen.    2. IFG (impaired fasting glucose)   Under good control. Continue same regimen.'    3. Mixed hyperlipidemia      Under good control. Continue same regimen.    4. Obesity (BMI 30.0-34.9)    diet/exercise/lose 15 lbs.; patient counseled    - Patient identified as having weight management issue.  Appropriate orders and counseling given.    5. Screening for colorectal cancer         - Referral to GI for  Colonoscopy

## 2022-11-18 DIAGNOSIS — I10 ESSENTIAL HYPERTENSION: ICD-10-CM

## 2022-11-18 RX ORDER — AMLODIPINE BESYLATE 2.5 MG/1
2.5 TABLET ORAL DAILY
Qty: 30 TABLET | Refills: 1 | Status: SHIPPED | OUTPATIENT
Start: 2022-11-18 | End: 2022-12-14

## 2022-12-02 ENCOUNTER — NON-PROVIDER VISIT (OUTPATIENT)
Dept: MEDICAL GROUP | Age: 62
End: 2022-12-02
Payer: OTHER MISCELLANEOUS

## 2022-12-02 VITALS — DIASTOLIC BLOOD PRESSURE: 82 MMHG | SYSTOLIC BLOOD PRESSURE: 142 MMHG

## 2022-12-02 NOTE — PROGRESS NOTES
Desirae Paris is a 62 y.o. female here for a non-provider visit for BP check    Encounter Vitals  Blood Pressure: (!) 142/82    If abnormal, was the Registered Nurse (office provider if RN is unavailable) notified today? Not Indicated    Routed to PCP/Requested Provider? Yes

## 2022-12-11 DIAGNOSIS — I10 ESSENTIAL HYPERTENSION: ICD-10-CM

## 2022-12-14 DIAGNOSIS — I10 ESSENTIAL HYPERTENSION: ICD-10-CM

## 2022-12-14 RX ORDER — AMLODIPINE BESYLATE 2.5 MG/1
2.5 TABLET ORAL DAILY
Qty: 90 TABLET | Refills: 4 | Status: SHIPPED | OUTPATIENT
Start: 2022-12-14 | End: 2024-01-09

## 2022-12-14 RX ORDER — AMLODIPINE BESYLATE 2.5 MG/1
2.5 TABLET ORAL DAILY
Qty: 30 TABLET | Refills: 1 | OUTPATIENT
Start: 2022-12-14

## 2022-12-14 NOTE — TELEPHONE ENCOUNTER
Received request via: Pharmacy    Was the patient seen in the last year in this department? Yes    Does the patient have an active prescription (recently filled or refills available) for medication(s) requested? No    Does the patient have long-term Plus and need 100 day supply (blood pressure, diabetes and cholesterol meds only)? Patient does not have SCP     English

## 2022-12-14 NOTE — TELEPHONE ENCOUNTER
Patient comment: I just received the following message from [facility]: [facility]: [place], We're still waiting for your DrJanice to approve a refill for AML. Pls call your Dr. for more information. the bottle says I have 1 more refill so I don’t understand why they just can’t go ahead and fill it. Either way I am running low ( only have a few left). Thank you. I plan to come in Friday for a BP reading with the medical assistant.

## 2022-12-20 ENCOUNTER — NON-PROVIDER VISIT (OUTPATIENT)
Dept: MEDICAL GROUP | Age: 62
End: 2022-12-20
Payer: OTHER MISCELLANEOUS

## 2022-12-20 VITALS — DIASTOLIC BLOOD PRESSURE: 82 MMHG | SYSTOLIC BLOOD PRESSURE: 142 MMHG

## 2022-12-20 NOTE — PROGRESS NOTES
Patient states that she took her Blood pressure at home and it was 171/97 after she took her medication this morning.        Desirae Paris is a 62 y.o. female here for a non-provider visit for Blood pressure check    Encounter Vitals  Blood Pressure: (!) 142/82    If abnormal, was the Registered Nurse (office provider if RN is unavailable) notified today? Yes    Routed to PCP/Requested Provider? Yes

## 2023-02-07 ENCOUNTER — NON-PROVIDER VISIT (OUTPATIENT)
Dept: MEDICAL GROUP | Age: 63
End: 2023-02-07
Payer: OTHER MISCELLANEOUS

## 2023-02-07 VITALS — DIASTOLIC BLOOD PRESSURE: 82 MMHG | SYSTOLIC BLOOD PRESSURE: 130 MMHG

## 2023-02-07 NOTE — PROGRESS NOTES
Desirae Paris is a 62 y.o. female here for a non-provider visit for  blood pressure check     If abnormal was an in office provider notified today (if so, indicate provider)? Yes    Routed to PCP? Yes

## 2023-03-23 ENCOUNTER — OFFICE VISIT (OUTPATIENT)
Dept: MEDICAL GROUP | Age: 63
End: 2023-03-23

## 2023-03-23 VITALS
DIASTOLIC BLOOD PRESSURE: 84 MMHG | TEMPERATURE: 98 F | WEIGHT: 205 LBS | SYSTOLIC BLOOD PRESSURE: 130 MMHG | OXYGEN SATURATION: 99 % | HEIGHT: 68 IN | HEART RATE: 74 BPM | BODY MASS INDEX: 31.07 KG/M2

## 2023-03-23 DIAGNOSIS — H61.23 BILATERAL IMPACTED CERUMEN: ICD-10-CM

## 2023-03-23 DIAGNOSIS — H92.01 RIGHT EAR PAIN: ICD-10-CM

## 2023-03-23 PROCEDURE — 99212 OFFICE O/P EST SF 10 MIN: CPT | Mod: 25 | Performed by: FAMILY MEDICINE

## 2023-03-23 PROCEDURE — 69210 REMOVE IMPACTED EAR WAX UNI: CPT | Performed by: FAMILY MEDICINE

## 2023-03-23 ASSESSMENT — PATIENT HEALTH QUESTIONNAIRE - PHQ9: CLINICAL INTERPRETATION OF PHQ2 SCORE: 0

## 2023-03-23 ASSESSMENT — FIBROSIS 4 INDEX: FIB4 SCORE: 0.75

## 2023-03-23 NOTE — PROGRESS NOTES
This medical record contains text that has been entered with the assistance of computer voice recognition and dictation software.  Therefore, it may contain unintended errors in text, spelling, punctuation, or grammar      Chief Complaint   Patient presents with    Ear Pain     Right ear is worse than the left          Desirae Paris is a 62 y.o. female here evaluation and management of: Right ear discomfort      HPI:           1. Right ear pain  NEW UNDIAGNOSED PROBLEM    Desirae is a very pleasant 62-year-old female new to me who presents to clinic with a chief complaint of right ear pain.  Her insurance no longer covers for now physicians but she still comes here because she is very satisfied with her care.  She states this started few weeks ago right side is worse than the left denies any ringing in the ear no pain no fevers chills night sweats no nausea or vomiting no dizzy episodes.      Current medicines (including changes today)  Current Outpatient Medications   Medication Sig Dispense Refill    fenofibrate (TRIGLIDE) 160 MG tablet TAKE 1 TABLET BY MOUTH EVERY DAY 90 Tablet 4    amLODIPine (NORVASC) 2.5 MG Tab TAKE 1 TABLET BY MOUTH EVERY DAY 90 Tablet 4    metoprolol tartrate (LOPRESSOR) 50 MG Tab TAKE 1 TABLET BY MOUTH TWICE A DAY 60 Tablet 1    potassium chloride (MICRO-K) 10 MEQ capsule TAKE 1 CAPSULE BY MOUTH TWICE A DAY 60 Capsule 25    lisinopril (PRINIVIL) 20 MG Tab TAKE 1 TABLET BY MOUTH EVERY DAY 90 Tablet 3    hydroCHLOROthiazide (HYDRODIURIL) 25 MG Tab TAKE 1 TABLET BY MOUTH EVERY DAY 30 Tablet 8    glucosamine Sulfate 500 MG Cap Take 1 Cap by mouth 3 times a day, with meals. 270 Cap 4    Omega-3 Fatty Acids (FISH OIL) 1000 MG CAPS capsule Take 1 Capsule by mouth 3 times a day with meals.      vitamin D (CHOLECALCIFEROL) 1000 UNIT TABS Take 2 Tabs by mouth every day. 30 Tab 11     No current facility-administered medications for this visit.     She  has a past medical history of Hyperlipidemia,  "Hypertension, and IFG (impaired fasting glucose).  She  has a past surgical history that includes hysterectomy radical and knee arthroscopy.  Social History     Tobacco Use    Smoking status: Never    Smokeless tobacco: Never   Vaping Use    Vaping Use: Never used   Substance Use Topics    Alcohol use: Not Currently     Alcohol/week: 2.0 oz     Types: 4 Glasses of wine per week    Drug use: Yes     Types: Marijuana     Comment: occassional     Social History     Social History Narrative    Not on file     Family History   Problem Relation Age of Onset    Hypertension Mother     Hyperlipidemia Mother     Hypertension Father     Hyperlipidemia Father      Family Status   Relation Name Status    MGMo  Other    Mo  (Not Specified)    Fa  (Not Specified)         ROS    The pertinent  ROS findings can be seen in the HPI above.     All other systems reviewed and are negative     Objective:     /84 (BP Location: Right arm, Patient Position: Sitting, BP Cuff Size: Adult)   Pulse 74   Temp 36.7 °C (98 °F) (Temporal)   Ht 1.727 m (5' 8\")   Wt 93 kg (205 lb)   SpO2 99%  Body mass index is 31.17 kg/m².      Physical Exam:    Constitutional: Alert, no distress.  Skin: No suspicious lesions  Eye: Equal, round and reactive, conjunctiva clear, lids normal.  ENMT: Lips without lesions, good dentition, oropharynx clear.  Bilateral cerumen impaction  Neck: Trachea midline, no masses, no thyromegaly. No cervical or supraclavicular lymphadenopathy.  Respiratory: Unlabored respiratory effort, lungs clear to auscultation, no wheezes, no ronchi.  Cardiovascular: Normal S1, S2, no murmur, no edema  Abdomen: Soft, non-tender, no masses, no hepatosplenomegaly.         Procedure--gentle irrigation of the ear canal was performed with a large syringe (200 mL) and warm water treated with a bacteriostatic hydrogen peroxide.(Used both lavage and currette )    PLAN:   The following treatment plan was discussed    All recent labs and " provider notes reviewed    1. Right ear pain    2. Bilateral impacted cerumen     Patient tolerated procedure well  There were no adverse events  Patient was given post procedure precautions       Instructed to Follow up in clinic or ER for worsening symptoms, difficulty breathing, lack of expected recovery, or should new symptoms or problems arise.    Followup: Return in about 3 months (around 6/23/2023) for Reevaluation, labs, With PCP.

## 2023-05-18 DIAGNOSIS — I10 ESSENTIAL HYPERTENSION: ICD-10-CM

## 2023-05-18 RX ORDER — HYDROCHLOROTHIAZIDE 25 MG/1
25 TABLET ORAL
Qty: 90 TABLET | Refills: 0 | Status: SHIPPED | OUTPATIENT
Start: 2023-05-18

## 2023-05-22 ENCOUNTER — TELEPHONE (OUTPATIENT)
Dept: MEDICAL GROUP | Age: 63
End: 2023-05-22
Payer: OTHER MISCELLANEOUS

## 2024-01-09 DIAGNOSIS — I10 ESSENTIAL HYPERTENSION: ICD-10-CM

## 2024-01-09 RX ORDER — AMLODIPINE BESYLATE 2.5 MG/1
2.5 TABLET ORAL DAILY
Qty: 90 TABLET | Refills: 0 | Status: SHIPPED | OUTPATIENT
Start: 2024-01-09

## 2024-05-06 DIAGNOSIS — I10 ESSENTIAL HYPERTENSION: ICD-10-CM

## 2024-05-06 RX ORDER — AMLODIPINE BESYLATE 2.5 MG/1
2.5 TABLET ORAL DAILY
Qty: 90 TABLET | Refills: 0 | Status: SHIPPED | OUTPATIENT
Start: 2024-05-06

## 2024-08-05 DIAGNOSIS — I10 ESSENTIAL HYPERTENSION: ICD-10-CM

## 2024-08-05 RX ORDER — AMLODIPINE BESYLATE 2.5 MG/1
2.5 TABLET ORAL DAILY
Qty: 90 TABLET | Refills: 0 | Status: SHIPPED | OUTPATIENT
Start: 2024-08-05